# Patient Record
Sex: FEMALE | Race: BLACK OR AFRICAN AMERICAN | NOT HISPANIC OR LATINO | Employment: PART TIME
[De-identification: names, ages, dates, MRNs, and addresses within clinical notes are randomized per-mention and may not be internally consistent; named-entity substitution may affect disease eponyms.]

---

## 2017-10-15 ENCOUNTER — HEALTH MAINTENANCE LETTER (OUTPATIENT)
Age: 37
End: 2017-10-15

## 2020-10-26 ENCOUNTER — AMBULATORY - HEALTHEAST (OUTPATIENT)
Dept: OTHER | Facility: CLINIC | Age: 40
End: 2020-10-26

## 2021-02-28 ENCOUNTER — COMMUNICATION - HEALTHEAST (OUTPATIENT)
Dept: SCHEDULING | Facility: CLINIC | Age: 41
End: 2021-02-28

## 2021-03-02 ENCOUNTER — COMMUNICATION - HEALTHEAST (OUTPATIENT)
Dept: SCHEDULING | Facility: CLINIC | Age: 41
End: 2021-03-02

## 2021-05-14 ENCOUNTER — HOSPITAL ENCOUNTER (EMERGENCY)
Dept: EMERGENCY MEDICINE | Facility: CLINIC | Age: 41
Discharge: HOME | End: 2021-05-14
Attending: EMERGENCY MEDICINE
Payer: COMMERCIAL

## 2021-05-14 DIAGNOSIS — J45.901 EXACERBATION OF ASTHMA, UNSPECIFIED ASTHMA SEVERITY, UNSPECIFIED WHETHER PERSISTENT: ICD-10-CM

## 2021-05-14 DIAGNOSIS — R20.2 PARESTHESIAS: ICD-10-CM

## 2021-05-14 LAB
ALBUMIN SERPL-MCNC: 3.8 G/DL (ref 3.5–5)
ALP SERPL-CCNC: 67 U/L (ref 45–120)
ALT SERPL W P-5'-P-CCNC: 16 U/L (ref 0–45)
ANION GAP SERPL CALCULATED.3IONS-SCNC: 7 MMOL/L (ref 5–18)
AST SERPL W P-5'-P-CCNC: 17 U/L (ref 0–40)
ATRIAL RATE - MUSE: 97 BPM
BASOPHILS # BLD AUTO: 0.1 THOU/UL (ref 0–0.2)
BASOPHILS NFR BLD AUTO: 1 % (ref 0–2)
BILIRUB SERPL-MCNC: 0.4 MG/DL (ref 0–1)
BUN SERPL-MCNC: 9 MG/DL (ref 8–22)
CALCIUM SERPL-MCNC: 9.1 MG/DL (ref 8.5–10.5)
CHLORIDE BLD-SCNC: 107 MMOL/L (ref 98–107)
CO2 SERPL-SCNC: 27 MMOL/L (ref 22–31)
CREAT SERPL-MCNC: 0.7 MG/DL (ref 0.6–1.1)
DIASTOLIC BLOOD PRESSURE - MUSE: 95 MMHG
EOSINOPHIL # BLD AUTO: 0.1 THOU/UL (ref 0–0.4)
EOSINOPHIL NFR BLD AUTO: 1 % (ref 0–6)
ERYTHROCYTE [DISTWIDTH] IN BLOOD BY AUTOMATED COUNT: 13.4 % (ref 11–14.5)
GFR SERPL CREATININE-BSD FRML MDRD: >60 ML/MIN/1.73M2
GLUCOSE BLD-MCNC: 74 MG/DL (ref 70–125)
HCT VFR BLD AUTO: 39.1 % (ref 35–47)
HGB BLD-MCNC: 12.5 G/DL (ref 12–16)
IMM GRANULOCYTES # BLD: 0 THOU/UL
IMM GRANULOCYTES NFR BLD: 0 %
INTERPRETATION ECG - MUSE: NORMAL
LIPASE SERPL-CCNC: 31 U/L (ref 0–52)
LYMPHOCYTES # BLD AUTO: 3.9 THOU/UL (ref 0.8–4.4)
LYMPHOCYTES NFR BLD AUTO: 43 % (ref 20–40)
MAGNESIUM SERPL-MCNC: 2 MG/DL (ref 1.8–2.6)
MCH RBC QN AUTO: 28.3 PG (ref 27–34)
MCHC RBC AUTO-ENTMCNC: 32 G/DL (ref 32–36)
MCV RBC AUTO: 89 FL (ref 80–100)
MONOCYTES # BLD AUTO: 0.7 THOU/UL (ref 0–0.9)
MONOCYTES NFR BLD AUTO: 8 % (ref 2–10)
NEUTROPHILS # BLD AUTO: 4.3 THOU/UL (ref 2–7.7)
NEUTROPHILS NFR BLD AUTO: 48 % (ref 50–70)
P AXIS - MUSE: 43 DEGREES
PLATELET # BLD AUTO: 333 THOU/UL (ref 140–440)
PMV BLD AUTO: 10.6 FL (ref 8.5–12.5)
POTASSIUM BLD-SCNC: 4 MMOL/L (ref 3.5–5)
PR INTERVAL - MUSE: 128 MS
PROT SERPL-MCNC: 8.3 G/DL (ref 6–8)
QRS DURATION - MUSE: 84 MS
QT - MUSE: 348 MS
QTC - MUSE: 441 MS
R AXIS - MUSE: -7 DEGREES
RBC # BLD AUTO: 4.42 MILL/UL (ref 3.8–5.4)
SODIUM SERPL-SCNC: 141 MMOL/L (ref 136–145)
SYSTOLIC BLOOD PRESSURE - MUSE: 135 MMHG
T AXIS - MUSE: 21 DEGREES
TROPONIN I SERPL-MCNC: <0.01 NG/ML (ref 0–0.29)
VENTRICULAR RATE- MUSE: 97 BPM
WBC: 9 THOU/UL (ref 4–11)

## 2021-05-15 ENCOUNTER — COMMUNICATION - HEALTHEAST (OUTPATIENT)
Dept: SCHEDULING | Facility: CLINIC | Age: 41
End: 2021-05-15

## 2021-05-21 ENCOUNTER — HOSPITAL ENCOUNTER (EMERGENCY)
Dept: EMERGENCY MEDICINE | Facility: CLINIC | Age: 41
Discharge: HOME | End: 2021-05-22
Attending: EMERGENCY MEDICINE
Payer: COMMERCIAL

## 2021-05-21 DIAGNOSIS — I10 HYPERTENSION, UNSPECIFIED TYPE: ICD-10-CM

## 2021-05-21 DIAGNOSIS — R51.9 ACUTE NONINTRACTABLE HEADACHE, UNSPECIFIED HEADACHE TYPE: ICD-10-CM

## 2021-05-21 LAB
ANION GAP SERPL CALCULATED.3IONS-SCNC: 12 MMOL/L (ref 5–18)
APTT PPP: 29 SECONDS (ref 24–37)
BASOPHILS # BLD AUTO: 0 THOU/UL (ref 0–0.2)
BASOPHILS NFR BLD AUTO: 0 % (ref 0–2)
BUN SERPL-MCNC: 10 MG/DL (ref 8–22)
CALCIUM SERPL-MCNC: 9.1 MG/DL (ref 8.5–10.5)
CHLORIDE BLD-SCNC: 104 MMOL/L (ref 98–107)
CO2 SERPL-SCNC: 24 MMOL/L (ref 22–31)
CREAT SERPL-MCNC: 0.74 MG/DL (ref 0.6–1.1)
EOSINOPHIL # BLD AUTO: 0.1 THOU/UL (ref 0–0.4)
EOSINOPHIL NFR BLD AUTO: 1 % (ref 0–6)
ERYTHROCYTE [DISTWIDTH] IN BLOOD BY AUTOMATED COUNT: 13.3 % (ref 11–14.5)
GFR SERPL CREATININE-BSD FRML MDRD: >60 ML/MIN/1.73M2
GLUCOSE BLD-MCNC: 99 MG/DL (ref 70–125)
HCT VFR BLD AUTO: 38 % (ref 35–47)
HGB BLD-MCNC: 12.1 G/DL (ref 12–16)
IMM GRANULOCYTES # BLD: 0 THOU/UL
IMM GRANULOCYTES NFR BLD: 0 %
INR PPP: 1.01 (ref 0.9–1.1)
LYMPHOCYTES # BLD AUTO: 4.5 THOU/UL (ref 0.8–4.4)
LYMPHOCYTES NFR BLD AUTO: 43 % (ref 20–40)
MCH RBC QN AUTO: 28 PG (ref 27–34)
MCHC RBC AUTO-ENTMCNC: 31.8 G/DL (ref 32–36)
MCV RBC AUTO: 88 FL (ref 80–100)
MONOCYTES # BLD AUTO: 0.6 THOU/UL (ref 0–0.9)
MONOCYTES NFR BLD AUTO: 6 % (ref 2–10)
NEUTROPHILS # BLD AUTO: 5.2 THOU/UL (ref 2–7.7)
NEUTROPHILS NFR BLD AUTO: 50 % (ref 50–70)
PLATELET # BLD AUTO: 359 THOU/UL (ref 140–440)
PMV BLD AUTO: 10.2 FL (ref 8.5–12.5)
POC PREG URINE (HCG) HE - HISTORICAL: NEGATIVE
POCT KIT EXPIRATION DATE HE - HISTORICAL: NORMAL
POCT KIT LOT NUMBER HE - HISTORICAL: NORMAL
POCT NEGATIVE CONTROL HE - HISTORICAL: NORMAL
POCT POSITIVE CONTROL HE - HISTORICAL: NORMAL
POTASSIUM BLD-SCNC: 3.9 MMOL/L (ref 3.5–5)
RBC # BLD AUTO: 4.32 MILL/UL (ref 3.8–5.4)
SODIUM SERPL-SCNC: 140 MMOL/L (ref 136–145)
WBC: 10.5 THOU/UL (ref 4–11)

## 2021-05-21 ASSESSMENT — MIFFLIN-ST. JEOR: SCORE: 1679.2

## 2021-05-22 ENCOUNTER — COMMUNICATION - HEALTHEAST (OUTPATIENT)
Dept: SCHEDULING | Facility: CLINIC | Age: 41
End: 2021-05-22

## 2021-05-22 ENCOUNTER — HOSPITAL ENCOUNTER (EMERGENCY)
Dept: EMERGENCY MEDICINE | Facility: CLINIC | Age: 41
Discharge: HOME | End: 2021-05-23
Attending: EMERGENCY MEDICINE
Payer: COMMERCIAL

## 2021-05-22 DIAGNOSIS — G44.209 ACUTE NON INTRACTABLE TENSION-TYPE HEADACHE: ICD-10-CM

## 2021-05-22 DIAGNOSIS — R20.2 PARESTHESIA: ICD-10-CM

## 2021-05-22 DIAGNOSIS — I10 ESSENTIAL HYPERTENSION: ICD-10-CM

## 2021-05-22 LAB
ALBUMIN SERPL-MCNC: 3.8 G/DL (ref 3.5–5)
ALP SERPL-CCNC: 83 U/L (ref 45–120)
ALT SERPL W P-5'-P-CCNC: 43 U/L (ref 0–45)
ANION GAP SERPL CALCULATED.3IONS-SCNC: 7 MMOL/L (ref 5–18)
AST SERPL W P-5'-P-CCNC: 28 U/L (ref 0–40)
ATRIAL RATE - MUSE: 101 BPM
BILIRUB SERPL-MCNC: 0.4 MG/DL (ref 0–1)
BUN SERPL-MCNC: 9 MG/DL (ref 8–22)
CALCIUM SERPL-MCNC: 9.1 MG/DL (ref 8.5–10.5)
CHLORIDE BLD-SCNC: 103 MMOL/L (ref 98–107)
CO2 SERPL-SCNC: 28 MMOL/L (ref 22–31)
CREAT SERPL-MCNC: 0.74 MG/DL (ref 0.6–1.1)
DIASTOLIC BLOOD PRESSURE - MUSE: NORMAL
GFR SERPL CREATININE-BSD FRML MDRD: >60 ML/MIN/1.73M2
GLUCOSE BLD-MCNC: 118 MG/DL (ref 70–125)
INTERPRETATION ECG - MUSE: NORMAL
MAGNESIUM SERPL-MCNC: 2 MG/DL (ref 1.8–2.6)
P AXIS - MUSE: 33 DEGREES
POTASSIUM BLD-SCNC: 3.8 MMOL/L (ref 3.5–5)
PR INTERVAL - MUSE: 148 MS
PROT SERPL-MCNC: 8.3 G/DL (ref 6–8)
QRS DURATION - MUSE: 76 MS
QT - MUSE: 352 MS
QTC - MUSE: 456 MS
R AXIS - MUSE: -12 DEGREES
SODIUM SERPL-SCNC: 138 MMOL/L (ref 136–145)
SYSTOLIC BLOOD PRESSURE - MUSE: NORMAL
T AXIS - MUSE: 21 DEGREES
VENTRICULAR RATE- MUSE: 101 BPM

## 2021-05-22 ASSESSMENT — MIFFLIN-ST. JEOR: SCORE: 1679.2

## 2021-05-23 LAB
ERYTHROCYTE [DISTWIDTH] IN BLOOD BY AUTOMATED COUNT: 13.2 % (ref 11–14.5)
HCT VFR BLD AUTO: 36.1 % (ref 35–47)
HGB BLD-MCNC: 11.7 G/DL (ref 12–16)
MCH RBC QN AUTO: 28.8 PG (ref 27–34)
MCHC RBC AUTO-ENTMCNC: 32.4 G/DL (ref 32–36)
MCV RBC AUTO: 89 FL (ref 80–100)
PLATELET # BLD AUTO: 328 THOU/UL (ref 140–440)
PMV BLD AUTO: 10.1 FL (ref 8.5–12.5)
RBC # BLD AUTO: 4.06 MILL/UL (ref 3.8–5.4)
WBC: 9.1 THOU/UL (ref 4–11)

## 2021-05-24 LAB
ATRIAL RATE - MUSE: 104 BPM
DIASTOLIC BLOOD PRESSURE - MUSE: 92 MMHG
INTERPRETATION ECG - MUSE: NORMAL
P AXIS - MUSE: 36 DEGREES
PR INTERVAL - MUSE: 146 MS
QRS DURATION - MUSE: 78 MS
QT - MUSE: 332 MS
QTC - MUSE: 436 MS
R AXIS - MUSE: -7 DEGREES
SYSTOLIC BLOOD PRESSURE - MUSE: 158 MMHG
T AXIS - MUSE: 16 DEGREES
VENTRICULAR RATE- MUSE: 104 BPM

## 2021-05-27 VITALS — WEIGHT: 234 LBS

## 2021-06-02 ENCOUNTER — RECORDS - HEALTHEAST (OUTPATIENT)
Dept: ADMINISTRATIVE | Facility: CLINIC | Age: 41
End: 2021-06-02

## 2021-06-15 NOTE — TELEPHONE ENCOUNTER
Call received from spouse, Juve.  Verbal consent from patient to speak to spouse.  Nancy is also present via speaker phone.    Recently hospitalized and has questions:  - She was not given a cardiac monitor at discharge  - She needs a note to return to work  - She needs a referral in order to schedule at the breast clinic    Currently, Jenny has a PCP appointment scheduled for tomorrow.    Advised to address these needs at that appointment.    Ena Heard RN  [facility] Nurse Advisor      Reason for Disposition    [1] Caller requesting NON-URGENT health information AND [2] PCP's office is the best resource    Protocols used: INFORMATION ONLY CALL-A-

## 2021-06-17 NOTE — ED PROVIDER NOTES
EMERGENCY DEPARTMENT ENCOUNTER      NAME: Nancy Delgado  AGE: 40 y.o. female  YOB: 1980  MRN: [id number]  EVALUATION DATE & TIME: 5/21/2021  9:27 PM    PCP: Summer Joel MD    ED PROVIDER: Amilcar Leung M.D.      Chief Complaint   Patient presents with     Hypertension         FINAL IMPRESSION:  1. Hypertension, unspecified type    2. Acute nonintractable headache, unspecified headache type          ED COURSE & MEDICAL DECISION MAKING:    Pertinent Labs & Imaging studies reviewed. (See chart for details)  PPE:  N95, goggles and gloves   10:05 PM I met with the patient for the initial interview and physical examination. Discussed plan for treatment and workup in the ED.   12:32 AM We discussed the plan for discharge and the patient is agreeable. Reviewed supportive cares, symptomatic treatment, outpatient follow up, and reasons to return to the Emergency Department. Patient to be discharged by ED RN.      40 y.o. female presents to the Emergency Department for evaluation of headache and hypertension.  Headache seems more consistent with migraine though could also be hypertensive headache.  Did consider intracranial hemorrhage.  To get a CT head CT and CTA.  No obvious signs of hemorrhage.  There is a questionable pseudoaneurysm of the ICA though seems likely artifact.  This would not be consistent with her symptoms.  I do not think she needs a lumbar puncture.  Labs otherwise unremarkable.  Creatinine is normal.  Patient feeling better here.  Blood pressure improved.  Given IV Dilaudid and IV Reglan with improvement of her symptoms.  We will have her follow-up with her primary for recheck.  She will return for any worsening symptoms.k    At the conclusion of the encounter I discussed the results of all of the tests and the disposition. The questions were answered. The patient or family acknowledged understanding and was agreeable with the care plan.       MEDICATIONS GIVEN IN THE  EMERGENCY:  Medications   sodium chloride flush 10 mL (NS) (has no administration in time range)   sodium chloride 0.9% 1,000 mL (0 mL Intravenous Stopped 5/22/21 0020)   metoclopramide injection 10 mg (REGLAN) (10 mg Intravenous Given 5/21/21 2226)   iopamidol solution 100 mL (ISOVUE-370) (100 mL Intravenous Given 5/21/21 2333)   HYDROmorphone injection 0.5 mg (DILAUDID) (0.5 mg Intravenous Given 5/21/21 2356)       NEW PRESCRIPTIONS STARTED AT TODAY'S ER VISIT  Current Discharge Medication List      CONTINUE these medications which have NOT CHANGED    Details   acetaminophen (TYLENOL) 500 MG tablet Take 1,000 mg by mouth every 6 (six) hours as needed for pain.      albuterol (PROVENTIL HFA;VENTOLIN HFA) 90 mcg/actuation inhaler Inhale 2 puffs every 6 (six) hours as needed for wheezing.      amLODIPine (NORVASC) 5 MG tablet Take 5 mg by mouth daily.      fluocinolone 0.01 % Oil Apply 1 application topically 2 (two) times a week. Apply to areas of hair loss twice daily and to entire scalp 1-2 x per week.      ibuprofen (ADVIL,MOTRIN) 200 MG tablet Take 400 mg by mouth every 6 (six) hours as needed for pain.      meclizine (ANTIVERT) 25 mg tablet Take 1 tablet (25 mg total) by mouth 3 (three) times a day as needed.  Qty: 30 tablet, Refills: 0    Associated Diagnoses: Dizziness                =================================================================    HPI    Patient information was obtained from: Patient    Use of : N/A      Nancy Delgado is a 40 y.o. female with a pertinent history of morbid obesity, HTN, vertigo, and generalized headaches who presents to this ED via walk in for evaluation of hypertension.    Per chart review, patient was seen in this ED on 05/14/21 (1 week ago) for evaluation of chest pain, palpitations, cough, and paresthesias. Normal EKG, chest x-ray, and labs. Nebulizer improved wheezing. She was discharged to start on prednisone.    Patient reports hypertension throughout  the afternoon today (systolic 140s on home monitor) with accompanying continuous, worsening, pounding frontal headache which radiates to the back of the head, not relieved with 2-3 ibuprofen at home. Patient notes a history of headaches but states this is more severe than her usual. She also notes dizziness, lightheadedness, photophobia of the left eye, nausea, and one episode of vomiting prior to arrival. She denies visual disturbance or any other pertinent complaints at this time.     REVIEW OF SYSTEMS   Review of Systems   Eyes: Positive for photophobia (left eye). Negative for visual disturbance.   Gastrointestinal: Positive for nausea and vomiting (one episode).   Neurological: Positive for dizziness, light-headedness and headaches.   All other systems reviewed and are negative.       PAST MEDICAL HISTORY:  Past Medical History:   Diagnosis Date     Asthma        PAST SURGICAL HISTORY:  Past Surgical History:   Procedure Laterality Date     Right-sided tympanomastoidectomy             CURRENT MEDICATIONS:    No current facility-administered medications on file prior to encounter.      Current Outpatient Medications on File Prior to Encounter   Medication Sig     acetaminophen (TYLENOL) 500 MG tablet Take 1,000 mg by mouth every 6 (six) hours as needed for pain.     albuterol (PROVENTIL HFA;VENTOLIN HFA) 90 mcg/actuation inhaler Inhale 2 puffs every 6 (six) hours as needed for wheezing.     amLODIPine (NORVASC) 5 MG tablet Take 5 mg by mouth daily.     fluocinolone 0.01 % Oil Apply 1 application topically 2 (two) times a week. Apply to areas of hair loss twice daily and to entire scalp 1-2 x per week.     ibuprofen (ADVIL,MOTRIN) 200 MG tablet Take 400 mg by mouth every 6 (six) hours as needed for pain.     meclizine (ANTIVERT) 25 mg tablet Take 1 tablet (25 mg total) by mouth 3 (three) times a day as needed.       ALLERGIES:  Allergies   Allergen Reactions     Acetazolamide Itching     Tramadol Itching      "Oxycodone      Vicodin [Hydrocodone-Acetaminophen]        FAMILY HISTORY:  History reviewed. No pertinent family history.    SOCIAL HISTORY:   Social History     Socioeconomic History     Marital status:      Spouse name: None     Number of children: None     Years of education: None     Highest education level: None   Occupational History     None   Social Needs     Financial resource strain: None     Food insecurity     Worry: None     Inability: None     Transportation needs     Medical: None     Non-medical: None   Tobacco Use     Smoking status: Never Smoker   Substance and Sexual Activity     Alcohol use: No     Drug use: No     Sexual activity: None   Lifestyle     Physical activity     Days per week: None     Minutes per session: None     Stress: None   Relationships     Social connections     Talks on phone: None     Gets together: None     Attends Worship service: None     Active member of club or organization: None     Attends meetings of clubs or organizations: None     Relationship status: None     Intimate partner violence     Fear of current or ex partner: None     Emotionally abused: None     Physically abused: None     Forced sexual activity: None   Other Topics Concern     None   Social History Narrative     None       VITALS:  Patient Vitals for the past 24 hrs:   BP Temp Temp src Pulse Resp SpO2 Height Weight   05/21/21 2246 95/76 -- -- 96 (!) 55 99 % -- --   05/21/21 2232 140/84 -- -- 94 25 100 % -- --   05/21/21 2145 128/87 -- -- (!) 101 19 100 % -- --   05/21/21 2138 (!) 138/94 -- -- (!) 103 17 97 % -- --   05/21/21 2125 (!) 188/110 97.6  F (36.4  C) Temporal (!) 112 20 100 % 5' 2\" (1.575 m) (!) 235 lb (106.6 kg)       PHYSICAL EXAM    Constitutional:  Well developed, well nourished, no acute distress   EYES: Conjunctivae clear  HENT:  Atraumatic, normocephalic Bilateral external ears normal, nose normal, oropharynx moist. Neck- supple   Respiratory:  No respiratory distress, normal " breath sounds, no rales, no wheezing, no cough, no stridor   Cardiovascular:  Normal rate, normal rhythm, no murmurs, capillary refill normal.  No chest wall tenderness  GI:  Soft, nondistended, nontender, no palpable masses, no rebound, no guarding   : No CVA tenderness.    Musculoskeletal:  No edema.  No cyanosis.  Range of motion major extremities intact. No tenderness to palpation or major deformities noted.    Integument: Warm, Dry, No erythema, No rash.   Neurologic:  Alert & oriented, 5 out of 5 strength in bilateral upper and lower extremities.  Sensation intact in all 4 extremes.  Cranial nerves intact.  No pronator drift , ambulatory  Psych: Affect normal, Mood normal.    NIH Stroke Scale      Interval: 2 hours post onset of symptoms  Time: 10:05 PM  Person Administering Scale: Amilcar Leung MD    Administer stroke scale items in the order listed. Record performance in each category after each subscale exam. Do not go back and change scores. Follow directions provided for each exam technique. Scores should reflect what the patient does, not what the clinician thinks the patient can do. The clinician should record answers while administering the exam and work quickly. Except where indicated, the patient should not be coached (i.e., repeated requests to patient to make a special effort).      1a  Level of consciousness: 0=alert; keenly responsive   1b. LOC questions:  0=Performs both tasks correctly   1c. LOC commands: 0=Performs both tasks correctly   2.  Best Gaze: 0=normal   3.  Visual: 0=No visual loss   4. Facial Palsy: 0=Normal symmetric movement   5a.  Motor left arm: 0=No drift, limb holds 90 (or 45) degrees for full 10 seconds   5b.  Motor right arm: 0=No drift, limb holds 90 (or 45) degrees for full 10 seconds   6a. motor left le=No drift, limb holds 90 (or 45) degrees for full 10 seconds   6b  Motor right le=No drift, limb holds 90 (or 45) degrees for full 10 seconds   7. Limb  Ataxia: 0=Absent   8.  Sensory: 0=Normal; no sensory loss   9. Best Language:  0=No aphasia, normal   10. Dysarthria: 0=Normal   11. Extinction and Inattention: 0=No abnormality   12. Distal motor function: 0=Normal    Total:   0          LAB:  All pertinent labs reviewed and interpreted.  Results for orders placed or performed during the hospital encounter of 05/21/21   Basic Metabolic Panel   Result Value Ref Range    Sodium 140 136 - 145 mmol/L    Potassium 3.9 3.5 - 5.0 mmol/L    Chloride 104 98 - 107 mmol/L    CO2 24 22 - 31 mmol/L    Anion Gap, Calculation 12 5 - 18 mmol/L    Glucose 99 70 - 125 mg/dL    Calcium 9.1 8.5 - 10.5 mg/dL    BUN 10 8 - 22 mg/dL    Creatinine 0.74 0.60 - 1.10 mg/dL    GFR MDRD Af Amer >60 >60 mL/min/1.73m2    GFR MDRD Non Af Amer >60 >60 mL/min/1.73m2   INR   Result Value Ref Range    INR 1.01 0.90 - 1.10   APTT(PTT)   Result Value Ref Range    PTT 29 24 - 37 seconds   HM1 (CBC with Diff)   Result Value Ref Range    WBC 10.5 4.0 - 11.0 thou/uL    RBC 4.32 3.80 - 5.40 mill/uL    Hemoglobin 12.1 12.0 - 16.0 g/dL    Hematocrit 38.0 35.0 - 47.0 %    MCV 88 80 - 100 fL    MCH 28.0 27.0 - 34.0 pg    MCHC 31.8 (L) 32.0 - 36.0 g/dL    RDW 13.3 11.0 - 14.5 %    Platelets 359 140 - 440 thou/uL    MPV 10.2 8.5 - 12.5 fL    Neutrophils % 50 50 - 70 %    Lymphocytes % 43 (H) 20 - 40 %    Monocytes % 6 2 - 10 %    Eosinophils % 1 0 - 6 %    Basophils % 0 0 - 2 %    Immature Granulocyte % 0 <=0 %    Neutrophils Absolute 5.2 2.0 - 7.7 thou/uL    Lymphocytes Absolute 4.5 (H) 0.8 - 4.4 thou/uL    Monocytes Absolute 0.6 0.0 - 0.9 thou/uL    Eosinophils Absolute 0.1 0.0 - 0.4 thou/uL    Basophils Absolute 0.0 0.0 - 0.2 thou/uL    Immature Granulocyte Absolute 0.0 <=0.0 thou/uL   POCT pregnancy, urine   Result Value Ref Range    POC Preg, Urine Negative Negative    POCT Kit Lot Number [id number]     POCT Kit Expiration Date 11-     Pos Control Valid Control Valid Control    Neg Control Valid  Control Valid Control       RADIOLOGY:  Reviewed all pertinent imaging. Please see official radiology report.  Cta Head And Neck    Result Date: 5/22/2021  EXAM: CTA HEAD AND NECK LOCATION: [facility] DATE/TIME: 5/21/2021 11:34 PM INDICATION: headache, hypertension COMPARISON: CT head 02/27/2021 CONTRAST: Iopamidol (Isovue-370) 100mL TECHNIQUE: Head and neck CT angiogram with IV contrast. Noncontrast head CT followed by axial helical CT images of the head and neck vessels obtained during the arterial phase of intravenous contrast administration. Axial 2D reconstructed images and multiplanar 3D MIP reconstructed images of the head and neck vessels were performed by the technologist. Dose reduction techniques were used. All stenosis measurements made according to NASCET criteria unless otherwise specified. FINDINGS: NONCONTRAST HEAD CT: INTRACRANIAL CONTENTS: No intracranial hemorrhage, extraaxial collection, or mass effect.  No CT evidence of acute infarct. Normal parenchymal attenuation. Normal ventricles and sulci. VISUALIZED ORBITS/SINUSES/MASTOIDS: No intraorbital abnormality. No paranasal sinus mucosal disease. Post right mastoidectomy. BONES/SOFT TISSUES: No acute abnormality. HEAD CTA: ANTERIOR CIRCULATION: No stenosis/occlusion, aneurysm, or high flow vascular malformation. Standard [place] anatomy. POSTERIOR CIRCULATION: No stenosis/occlusion, aneurysm, or high flow vascular malformation. Dominant left and smaller right vertebral artery contribute to a normal basilar artery. DURAL VENOUS SINUSES: Expected enhancement of the major dural venous sinuses. NECK CTA: RIGHT CAROTID: No measurable stenosis or dissection. LEFT CAROTID: No measurable stenosis or dissection. Very tiny outpouching at the proximal left ICA posterior laterally could represent tiny pseudoaneurysm versus artifact from volume averaging. VERTEBRAL ARTERIES: No focal stenosis or dissection. Dominant left and  smaller right vertebral arteries. AORTIC ARCH: Classic aortic arch anatomy with no significant stenosis at the origin of the great vessels. NONVASCULAR STRUCTURES: Unremarkable.     HEAD CT: 1.  Normal head CT. HEAD CTA: 1.  Normal CTA [place]. NECK CTA: 1.  No hemodynamically significant narrowing of a major intracranial vessel. 2.  Questionable tiny pseudoaneurysm at the proximal left ICA, as described.      EKG:    Performed at: 2137    Impression: Sinus tachycardia.  Unchanged from previous dated May 14, 2021.    Sinus tachycardia ventricular to 101.  NC interval 148.  QRS 76.  QTc 456  I have independently reviewed and interpreted the EKG(s) documented above.      I, Yovany Armijo, am serving as a scribe to document services personally performed by Dr. Amilcar Leung based on my observation and the provider's statements to me. I, Amilcar Leung MD attest that Yovany Armijo is acting in a scribe capacity, has observed my performance of the services and has documented them in accordance with my direction.    Amilcar Leung M.D.  Emergency Medicine  [facility] EMERGENCY ROOM  [address] [address]  Dept: [phone]  Loc: [phone]       Amilcar Leung MD  05/22/21 0102

## 2021-06-17 NOTE — ED TRIAGE NOTES
Pt presents via EMS with headache and hypertension. Pt reports episode of facial numbness at 1900 lasting 10 minutes but resolving. No deficits noted currently

## 2021-06-17 NOTE — ED TRIAGE NOTES
Pt with 2 hour history of headache in the frontal lobe wrapping back to the occipital. Also dizziness and leg weakness and tingling. And hypertension.

## 2021-06-17 NOTE — ED TRIAGE NOTES
Patient reports that she at therapy for vertigo this morning and her legs became numb, which has since resolved.  Patient reports that she feels like her heart is racing and has been taking Carvedilol, which is not helping she reports.  Patient also has a occipital headache.  Mimi Deleon RN.......5/14/2021 4:28 PM    Patient reports that she is tested weekly at work for Covid, she had a negative test on Monday, has had a cough r/t to her asthma, she reports.

## 2021-06-17 NOTE — ED PROVIDER NOTES
EMERGENCY DEPARTMENT ENCOUNTER      NAME: Nancy Delgado  AGE: 40 y.o. female  YOB: 1980  MRN: [id number]  EVALUATION DATE & TIME: 5/14/2021  4:22 PM    PCP: Summer Joel MD    ED PROVIDER: Ema Jacob PA-C      Chief Complaint   Patient presents with     Palpitations     Headache         FINAL IMPRESSION:  1. Exacerbation of asthma, unspecified asthma severity, unspecified whether persistent    2. Paresthesias          ED COURSE & MEDICAL DECISION MAKING:    Pertinent Labs & Imaging studies reviewed. (See chart for details)    40 y.o. female presents to the Emergency Department for evaluation of multiple complaints.     Physical exam is remarkable for a well appearing female who is in no acute distress. Heart and lung sounds remarkable for diffuse expiratory wheezing, harsh cough noted. Patient has left anterior chest wall tenderness to palpation without skin abnormalities. Abdomen is soft and non-tender. No focal neurological deficits noted; strength is 5/5 in the upper and lower extremities bilaterally. Vital signs are stable and she is afebrile.     CBC is unremarkable with no leukocytosis or anemia.  CMP is unremarkable with no significant electrolyte derangements, normal liver and kidney function. Lipase within normal limits. Troponin is negative, EKG without acute ischemic changes. Magnesium within normal limits. CXR unremarkable.     The patient was given IV decadron for her asthma as well as toradol for a headache. She was also given a DUOneb with improvement in her coughing/wheezing. I do not think any further emergent labs or imaging are indicated at this time. The patient has been having ongoing chest pain and palpitations for months and reports that there have been no changes with this, she is not currently having chest pain so I do not think further cardiac evaluation is indicated. She has had multiple CTA PE runs since these symptoms started with no evidence of  PE. She had a cardiac stress test and ECHO last month which were unremarkable. Regarding her paresthesias, the symptoms resolved without intervention and she has not had recurrence of symptoms since being in the ED. There are no focal neurological deficits on exam and her symptoms were bilateral so my concern for stroke is low. I do suspect she is having an asthma exacerbation, sent prescription for prednisone to her pharmacy. She has an albuterol inhaler to use. Recommend follow up with her PCP in the next few days for recheck, advise return to the ER if she develops any new or worsening symptoms like severe pain,fever, chest pain or difficulty breathing worse than usual, passing out, new neurological deficits, or any other concerning symptoms. The patient is amenable to this treatment plan and verbalized her understanding. Care was discussed with  who is in agreement with the treatment plan.     ED Course   4:50 PM I met with the patient, obtained history, performed an initial exam, and discussed options and plan for diagnostics and treatment here in the ED. PPE worn including surgical mask and eye protection.   5:03 PM I staffed the case with Dr. Subramanian.   6:50 PM I rechecked and updated the patient. We discussed the plan for discharge and the patient is agreeable. Reviewed supportive cares, symptomatic treatment, outpatient follow up, and reasons to return to the Emergency Department. Patient to be discharged by ED RN.      At the conclusion of the encounter I discussed the results of all of the tests and the disposition. The questions were answered. The patient or family acknowledged understanding and was agreeable with the care plan.     Voice recognition software was used in the creation of this note. Any grammatical or nonsensical errors are due to inherent errors with the software and are not the intention of the writer.     MEDICATIONS GIVEN IN THE EMERGENCY:  Medications   sodium chloride flush 10 mL  (NS) (has no administration in time range)   ipratropium-albuteroL 0.5-2.5 mg/3 mL nebulizer solution 3 mL (DUO-NEB) (3 mL Nebulization Given 5/14/21 1755)   methylPREDNISolone sod suc(PF) injection 125 mg (Solu-MEDROL) (125 mg Intravenous Given 5/14/21 1833)   ketorolac injection 15 mg (TORADOL) (15 mg Intravenous Given 5/14/21 1832)       NEW PRESCRIPTIONS STARTED AT TODAY'S ER VISIT  Current Discharge Medication List      START taking these medications    Details   predniSONE (DELTASONE) 50 MG tablet Take 50 mg by mouth daily for 5 days.  Qty: 5 tablet, Refills: 0    Comments: Start tomorrow 05/15/21  Associated Diagnoses: Exacerbation of asthma, unspecified asthma severity, unspecified whether persistent         CONTINUE these medications which have NOT CHANGED    Details   acetaminophen (TYLENOL) 500 MG tablet Take 1,000 mg by mouth every 6 (six) hours as needed for pain.      albuterol (PROVENTIL HFA;VENTOLIN HFA) 90 mcg/actuation inhaler Inhale 2 puffs every 6 (six) hours as needed for wheezing.      amLODIPine (NORVASC) 5 MG tablet Take 5 mg by mouth daily.      fluocinolone 0.01 % Oil Apply 1 application topically 2 (two) times a week. Apply to areas of hair loss twice daily and to entire scalp 1-2 x per week.      ibuprofen (ADVIL,MOTRIN) 200 MG tablet Take 400 mg by mouth every 6 (six) hours as needed for pain.      meclizine (ANTIVERT) 25 mg tablet Take 1 tablet (25 mg total) by mouth 3 (three) times a day as needed.  Qty: 30 tablet, Refills: 0    Associated Diagnoses: Dizziness                =================================================================    HPI    Patient information was obtained from: Patient    Use of Intrepreter: N/A      Nancy Delgado is a 40 y.o. female with a PMHx of HTN, pseudotumor cerebri, migraines, and asthma, who presents with numbness.    Patient was at physical therapy this morning when she reports sudden onset of bilateral leg numbness, palpitations, as well as  sharp pains in her mid chest and the back of her head. She has been dealing with the intermittent palpitations and sharp chest and head pains for some time (and has been evaluated in the ED for them in the last few months), but she has never experienced the leg numbness before. The numbness only lasted for about 5 minutes and has now resolved. No focal weakness.  Additionally, the patient has been experiencing a cough for 5 days which she associates with an asthma exacerbation. No previous admissions for asthma. Patient has been vaccinated against COVID. She also is tested routinely and tested negative earlier this week. No fever, chills, nausea, vomiting, leg swelling, or other significant new symptoms at this time.       REVIEW OF SYSTEMS   Constitutional:  No reported fever, chills   Respiratory: Positive for cough  Cardiovascular: Positive for intermittent chest pain, palpitations  GI:  No reported nausea, vomiting  Neurologic:  No focal weakness. Positive for bilateral leg numbness (resolved), intermittent headaches    All other systems reviewed and are negative unless noted in HPI.    PAST MEDICAL HISTORY:  Past Medical History:   Diagnosis Date     Asthma        PAST SURGICAL HISTORY:  Past Surgical History:   Procedure Laterality Date     Right-sided tympanomastoidectomy         CURRENT MEDICATIONS:    No current facility-administered medications on file prior to encounter.      Current Outpatient Medications on File Prior to Encounter   Medication Sig     acetaminophen (TYLENOL) 500 MG tablet Take 1,000 mg by mouth every 6 (six) hours as needed for pain.     albuterol (PROVENTIL HFA;VENTOLIN HFA) 90 mcg/actuation inhaler Inhale 2 puffs every 6 (six) hours as needed for wheezing.     amLODIPine (NORVASC) 5 MG tablet Take 5 mg by mouth daily.     fluocinolone 0.01 % Oil Apply 1 application topically 2 (two) times a week. Apply to areas of hair loss twice daily and to entire scalp 1-2 x per week.     ibuprofen  (ADVIL,MOTRIN) 200 MG tablet Take 400 mg by mouth every 6 (six) hours as needed for pain.     meclizine (ANTIVERT) 25 mg tablet Take 1 tablet (25 mg total) by mouth 3 (three) times a day as needed.       ALLERGIES:  Allergies   Allergen Reactions     Acetazolamide Itching     Tramadol Itching     Oxycodone      Vicodin [Hydrocodone-Acetaminophen]        FAMILY HISTORY:  History reviewed. No pertinent family history.    SOCIAL HISTORY:   Social History     Socioeconomic History     Marital status:      Spouse name: None     Number of children: None     Years of education: None     Highest education level: None   Occupational History     None   Social Needs     Financial resource strain: None     Food insecurity     Worry: None     Inability: None     Transportation needs     Medical: None     Non-medical: None   Tobacco Use     Smoking status: Never Smoker   Substance and Sexual Activity     Alcohol use: No     Drug use: No     Sexual activity: None   Lifestyle     Physical activity     Days per week: None     Minutes per session: None     Stress: None   Relationships     Social connections     Talks on phone: None     Gets together: None     Attends Protestant service: None     Active member of club or organization: None     Attends meetings of clubs or organizations: None     Relationship status: None     Intimate partner violence     Fear of current or ex partner: None     Emotionally abused: None     Physically abused: None     Forced sexual activity: None   Other Topics Concern     None   Social History Narrative     None       VITALS:  Patient Vitals for the past 24 hrs:   BP Temp Pulse Resp SpO2 Weight   05/14/21 1836 -- -- 77 22 100 % --   05/14/21 1730 123/69 -- 92 -- 100 % --   05/14/21 1628 (!) 143/103 98.2  F (36.8  C) (!) 106 18 100 % (!) 234 lb (106.1 kg)       PHYSICAL EXAM    VITAL SIGNS: /69   Pulse 77   Temp 98.2  F (36.8  C)   Resp 22   Wt (!) 234 lb (106.1 kg)   SpO2 100%   BMI  42.80 kg/m    General Appearance: Alert, cooperative, normal speech and facial symmetry, appears stated age, the patient does not appear in distress  Head:  Normocephalic, without obvious abnormality, atraumatic  Eyes: Conjunctiva/corneas clear, EOM's intact, no nystagmus, PERRL  ENT:  Lips, mucosa, and tongue normal; teeth and gums normal, no pharyngeal inflammation, no dysphonia or difficulty swallowing, membranes are moist without pallor  Neck:  No C spine tenderness or stepoffs  Cardio:  Regular rate and rhythm, S1 and S2 normal, no murmur, rub    or gallop, 2+ pulses symmetric in all extremities  Pulm:  Clear to auscultation bilaterally, respirations unlabored with no accessory muscle use  Abdomen:  Abdomen is soft, non-distended with no tenderness to palpation, rebound tenderness, or guarding.   Extremities:  Extremities normal, there is no tenderness to palpation , atraumatic, no cyanosis or edema, full function and range of motion, pulses equal in all extremities, normal cap refill, no joint swelling; strength is 5/5 in the upper and lower extremities bilaterally  Neuro: Patient is awake, alert, and responsive to voice. No gross motor weaknesses or sensory loss; moves all extremities.     LAB:  All pertinent labs reviewed and interpreted.  Results for orders placed or performed during the hospital encounter of 05/14/21   Comprehensive Metabolic Panel   Result Value Ref Range    Sodium 141 136 - 145 mmol/L    Potassium 4.0 3.5 - 5.0 mmol/L    Chloride 107 98 - 107 mmol/L    CO2 27 22 - 31 mmol/L    Anion Gap, Calculation 7 5 - 18 mmol/L    Glucose 74 70 - 125 mg/dL    BUN 9 8 - 22 mg/dL    Creatinine 0.70 0.60 - 1.10 mg/dL    GFR MDRD Af Amer >60 >60 mL/min/1.73m2    GFR MDRD Non Af Amer >60 >60 mL/min/1.73m2    Bilirubin, Total 0.4 0.0 - 1.0 mg/dL    Calcium 9.1 8.5 - 10.5 mg/dL    Protein, Total 8.3 (H) 6.0 - 8.0 g/dL    Albumin 3.8 3.5 - 5.0 g/dL    Alkaline Phosphatase 67 45 - 120 U/L    AST 17 0 - 40 U/L     ALT 16 0 - 45 U/L   Lipase   Result Value Ref Range    Lipase 31 0 - 52 U/L   Troponin I   Result Value Ref Range    Troponin I <0.01 0.00 - 0.29 ng/mL   Magnesium   Result Value Ref Range    Magnesium 2.0 1.8 - 2.6 mg/dL   HM1 (CBC with Diff)   Result Value Ref Range    WBC 9.0 4.0 - 11.0 thou/uL    RBC 4.42 3.80 - 5.40 mill/uL    Hemoglobin 12.5 12.0 - 16.0 g/dL    Hematocrit 39.1 35.0 - 47.0 %    MCV 89 80 - 100 fL    MCH 28.3 27.0 - 34.0 pg    MCHC 32.0 32.0 - 36.0 g/dL    RDW 13.4 11.0 - 14.5 %    Platelets 333 140 - 440 thou/uL    MPV 10.6 8.5 - 12.5 fL    Neutrophils % 48 (L) 50 - 70 %    Lymphocytes % 43 (H) 20 - 40 %    Monocytes % 8 2 - 10 %    Eosinophils % 1 0 - 6 %    Basophils % 1 0 - 2 %    Immature Granulocyte % 0 <=0 %    Neutrophils Absolute 4.3 2.0 - 7.7 thou/uL    Lymphocytes Absolute 3.9 0.8 - 4.4 thou/uL    Monocytes Absolute 0.7 0.0 - 0.9 thou/uL    Eosinophils Absolute 0.1 0.0 - 0.4 thou/uL    Basophils Absolute 0.1 0.0 - 0.2 thou/uL    Immature Granulocyte Absolute 0.0 <=0.0 thou/uL       RADIOLOGY:  Reviewed all pertinent imaging. Please see official radiology report.  Xr Chest 1 View Portable    Result Date: 5/14/2021  EXAM: XR CHEST 1 VIEW PORTABLE LOCATION: [facility] DATE/TIME: 5/14/2021 5:56 PM INDICATION: cough COMPARISON: 9/19/2016     Negative chest.      EKG:    Performed at: 16:33    I have independently reviewed and interpreted the EKG, along with the final read. EKG also reviewed by Dr. Subramanian.    Ventricular rate 97 bpm  IL interval 128 ms  QRS duration 84 ms  QT/QTc 348/441 ms  P-R-T axes 43 -7 21    Impression: NSR; no changes compared to previous      IDorothea am serving as a scribe to document services personally performed by Ema Jacob PA-C based on my observation and the provider's statements to me. I, Ema Jacob PA-C attest that Dorothea Rizvi is acting in a scribe capacity, has observed my performance of the services  and has documented them in accordance with my direction.     Ema Jacob PA-C  Emergency Medicine  [facility] EMERGENCY ROOM  [address] [address]  Dept: [phone]  Loc: [phone]     Ema Jacob PA-C  05/14/21 5830

## 2021-06-17 NOTE — ED PROVIDER NOTES
EMERGENCY DEPARTMENT MIDLEVEL SUPERVISORY NOTE    Date/Time: 5/14/2021 5:04 PM    I am seeing this patient along with Ema Pichardo PA-C.  I have seen and evaluated the patient independently at bedside and agree with the TREMAYNE's history, assessment and plan. I was wearing PPE throughout this encounter including surgical mask, goggles and gloves.      BRIEF HPI    Nancy Delgado is a 40 y.o. female with a history of hypertension, pseudotumor cerebri, migraines, asthma, who presents to the ED via walk in for evaluation of numbness. Patient with sudden onset of bilateral lower extremity numbness, palpitations and chest pain at PT this morning. Numbness persisted for about 5 minutes. No focal weakness. She also noted 5 days of cough, which she attributes to asthma. She is fully vaccinated against COVID-19. No other physical complaints.     PHYSICAL EXAM  Vitals: /89   Pulse 89   Temp 98.2  F (36.8  C)   Resp 22   Wt (!) 234 lb (106.1 kg)   SpO2 100%   BMI 42.80 kg/m    General: Frequent dry cough.  No acute distress.  HENT: Atraumatic. MMM.   Neck: Full AROM.  Cardiovascular: Tachycardic, regular.  Respiratory/Chest: Frequent dry cough.  No acute distress.  Abdomen: Non-distended.  Musculoskeletal: Normal appearing extremities without obvious deformities or signs of trauma. No edema or erythema.  Skin: Normal color. No visible rash or diaphoresis.  Neurologic: Alert. Face symmetric, moves all extremities spontaneously. Speech clear.  Psychiatric: Affect appropriate, cooperative.    EKG:    Performed at: 1633  Impression: Normal sinus rhythm.  No acute ischemic changes.  Normal intervals.  No evidence of WPW, Brugada, HOCM.  No significant change from previous.  Rate: 97  Rhythm: Sinus  QRS Interval: 84  QTc Interval: 441  Comparison: 3/31/2021  I have independently reviewed and interpreted the EKG(s) documented above.     ED COURSE & MEDICAL DECISION MAKING    Pertinent Labs and Imaging reviewed (see chart  for details)    Nancy Delgado is a 40 y.o. year old who presents for evaluation of paresthesias, chest pain, palpitations and cough.  Patient is currently following with cardiology and has had an extensive evaluation including labs, stress test, echo, CT PE studies since the symptoms began.  Although she was complaining of bilateral leg numbness prior to arrival, this resolved prior to my initial assessment.  Patient does have a history of asthma and has an albuterol inhaler that she uses at home.  Vitals on arrival notable for tachycardia. History and physical exam, as above. Please see midlevel note for additional details.     EKG reassuring.  Chest x-ray negative.  Labs unremarkable.  Patient was given a nebulizer with improvement in wheezing.  Very possible that at least her cough is related to an asthma exacerbations we will plan to start on prednisone.  Patient has a cardiologist with whom she follows and troponin is negative.  I see no indication for further work-up of chest pain/palpitations at this time and patient felt reassured, comfortable with following up with her outpatient providers for recheck.  PA discussed indications for return to the emergency department with the patient and she verbalized understanding.  All questions answered.  Discharged in stable condition.        FINAL IMPRESSION    Final diagnoses:   Exacerbation of asthma, unspecified asthma severity, unspecified whether persistent   Paresthesias       I, Angelica Wood, am serving as a scribe to document services personally performed by Dr. Subramanian based on my observation and the provider's statements to me. I,  Chantel Subramanian MD attest that Angelica Wood is acting in a scribe capacity, has observed my performance of the services and has documented them in accordance with my direction.     Chantel Subramanian MD  Emergency Medicine  [place]    5/14/2021 5:04 PM      Chantel Subramanian MD  05/14/21 2034

## 2021-06-17 NOTE — TELEPHONE ENCOUNTER
Pt is calling.    Complaining of a HA and tingling in her legs.  BP is 166/118. Was seen in the ED last night.   Denies visual changes. Denies chest pain.  She does have some shortness of breath, but stated that this is from her asthma which was also evaluated at the ED last night.   Care advice reviewed. I advised her to go back to the ED for re-evaluation. Pt is able to contact outside PCP. I advised her that there should be an on call physician. If unable to get a hold of PCP, I advised her that she needs to be seen in the ED and she verbalized understanding.    Reason for Disposition    [1] Systolic BP  >= 160 OR Diastolic >= 100 AND [2] cardiac or neurologic symptoms (e.g., chest pain, difficulty breathing, unsteady gait, blurred vision)    Additional Information    Negative: Difficult to awaken or acting confused (e.g., disoriented, slurred speech)    Negative: Severe difficulty breathing (e.g., struggling for each breath, speaks in single words)    Negative: [1] Weakness of the face, arm or leg on one side of the body AND [2] new onset    Negative: [1] Numbness (i.e., loss of sensation) of the face, arm or leg on one side of the body AND [2] new onset    Negative: [1] Chest pain lasts > 5 minutes AND [2] history of heart disease  (i.e., heart attack, bypass surgery, angina, angioplasty, CHF)    Negative: [1] Chest pain AND [2] took nitrogylcerin AND [3] pain was not relieved    Negative: Sounds like a life-threatening emergency to the triager    Negative: Symptom is main concern  (e.g., headache, chest pain)    Negative: Low blood pressure is main concern    Protocols used: HIGH BLOOD PRESSURE-A-    Abida Leonardo RN   [facility] Nurse Advisor  05/15/21 at 10:19 PM

## 2021-06-17 NOTE — ED PROVIDER NOTES
EMERGENCY DEPARTMENT ENCOUNTER      NAME: Nancy Delgado  AGE: 40 y.o. female  YOB: 1980  MRN: [id number]  EVALUATION DATE & TIME: 2021  9:30 PM    PCP: Summer Joel MD    ED PROVIDER: SOPHIE Velasquez    Chief Complaint   Patient presents with     Hypertension     FINAL IMPRESSION:  1. Essential hypertension    2. Paresthesia    3. Acute non intractable tension-type headache      ED COURSE & MEDICAL DECISION MAKIN:38 PM I met with the patient to gather history and to perform my initial exam. I discussed the plan for care while in the Emergency Department. PPE (gloves, glasses, surgical mask) was worn during patient encounters.     Pertinent Labs & Imaging studies reviewed. (See chart for details)  40 y.o. female presents to the Emergency Department for evaluation of headache, nausea, elevated blood pressure and left-sided facial numbness.  Patient evaluated in this emergency department yesterday evening for headache and hypertension.  Had CT CTA and laboratory testing performed.  Medicated for her headache in the emergency department.  Symptoms improved with interventions in the emergency department.  No acute intracranial findings clearly identified on CT CTA imaging.  Discharged with plan for outpatient follow-up.  She is chronically maintained on antihypertensive medications amlodipine and carvedilol.  Reports after leaving the emergency department she developed nausea vomiting in route to home.  This persisted throughout the evening.  She had return of her headache.  Overall her symptoms were manageable throughout the day but then after awakening from a nap she noted left-sided facial numbness this caused her to contact the nurse care line and they recommended emergency department evaluation.  She reports that her symptoms actually are resolving at this point with near resolution of the numbness to the left side of her face.  Denies weakness or facial droop.  Otherwise  symptoms fairly similar to what she was experiencing yesterday.  Has a history of relatively common headaches although not to this level or persistence.  Clinical examination notable for elevated blood pressure 162/104 heart rate was 105 vital signs otherwise unremarkable.  Patient did not appear to be in acute distress.  Neurological examination was normal.  NIH stroke scale normal.  No other concerning exam findings.  ECG demonstrating sinus tachycardia otherwise unchanged compared to ECG yesterday.  In light of her neurological symptoms I did recommend MRI imaging for further assessment.  Medicating patient's headache.  Will monitor her blood pressure in the emergency department and reassess after return of her testing results.    1:16 AM  Unremarkable.  Neurologically intact on initial and subsequent examination.  MRI unremarkable.  Blood pressure did improve with management of her headache and additional medications in the emergency department.  Patient is very anxious with regard to her labile blood pressures over the last several weeks.  Frustrated at the difficulty in controlling it.  Had a long discussion with her regarding elevated blood pressures in the emergency department and appropriate management both acute and long-term.  Overall given her clinical appearance and her unremarkable clinical examination and work-up at this time I do think the patient is okay for discharge home will work to get her blood pressure under better control with the addition of clonidine.  She has a scheduled follow-up with her primary care doctor in 48 hours.  I told her that is very important to keep that appointment to continue to monitor this and work to get her symptoms and her blood pressures under better control.  We did provide a work note to help facilitate that.  Overall patient was comfortable with this plan of care.  Relative to the facial tingling she experienced earlier.  I do think there is a component of anxiety  related to this is correlated with her checking her blood pressure and finding it to be elevated.  No intracranial pathology or stroke or other identifiable source for that symptomatology she has no facial droop to suggest Bell's palsy or other pathology at this point.  Recommended close monitoring of the symptoms given they have resolved at this point as mentioned I do think she is okay for discharge if the symptoms recur she has instructions to return to the emergency department.  Reviewed return precautions prior to discharge.    Plan and all results were discussed  Time was taken to answer all questions. Patient and/or associated parties expressed understanding and were agreeable to the treatment plan.  Warning signs and ER return precautions provided. Discharged with discharge instructions outlining plan for further care and follow up.     MEDICATIONS GIVEN IN THE EMERGENCY:  Medications   sodium chloride flush 10 mL (NS) (has no administration in time range)   sodium chloride 0.9% 500 mL (500 mL Intravenous New Bag 5/22/21 2203)   cloNIDine HCL tablet 0.1 mg (CATAPRES) (0.1 mg Oral Given 5/22/21 2150)   ketorolac injection 30 mg (TORADOL) (30 mg Intravenous Given 5/22/21 2203)   ondansetron injection 4 mg (ZOFRAN) (4 mg Intravenous Given 5/22/21 2203)   metoclopramide injection 10 mg (REGLAN) (10 mg Intravenous Given 5/23/21 0021)   diphenhydrAMINE injection 25 mg (BENADRYL) (25 mg Intravenous Given 5/23/21 0020)       NEW PRESCRIPTIONS STARTED AT TODAY'S ER VISIT  Current Discharge Medication List      CONTINUE these medications which have NOT CHANGED    Details   acetaminophen (TYLENOL) 500 MG tablet Take 1,000 mg by mouth every 6 (six) hours as needed for pain.      albuterol (PROVENTIL HFA;VENTOLIN HFA) 90 mcg/actuation inhaler Inhale 2 puffs every 6 (six) hours as needed for wheezing.      amLODIPine (NORVASC) 5 MG tablet Take 5 mg by mouth daily.      fluocinolone 0.01 % Oil Apply 1 application topically  2 (two) times a week. Apply to areas of hair loss twice daily and to entire scalp 1-2 x per week.      ibuprofen (ADVIL,MOTRIN) 200 MG tablet Take 400 mg by mouth every 6 (six) hours as needed for pain.      meclizine (ANTIVERT) 25 mg tablet Take 1 tablet (25 mg total) by mouth 3 (three) times a day as needed.  Qty: 30 tablet, Refills: 0    Associated Diagnoses: Dizziness                =================================================================    HPI    Patient information was obtained from: Patient    Use of Intrepreter: N/A       Nancy Delgado is a 40 y.o. female who has a pertinent medical history of HTN and morbid obesity who presents for evaluation of facial numbness.    Per chart review, patient was seen at [facility] Emergency Department on 5/21 (~1 day ago) for evaluation of headache hypertension. CT head showed no obvious signs of hemorrhage. There was questionable pseudoaneurysm of the ICA though was likely artifact. Creatine was normal. Labs otherwise unremarkable. Patient discharged home in stable condition after Dilaudid and Reglan with improvement in symptoms.       Patient reports that last night immediately after leaving the ER she vomited for 1 hour. Then this morning when she woke up the patient states her headache returned, it was slightly better than yesterday, so she checked her blood pressure which was 185/130. Then she noticed around 1930 (~2.5 hours ago) patient developed left sided facial numbness where she feels that her teeth are moving and her lip are swollen. She states those symptoms have slightly improved. Her last blood pressure was 180/127. She called the nurse line and they recommended patient she present to the ED.     Patient notes she has received her COVID vaccine, which is when her hypertension started. Otherwise her father has a history of blood pressure. She currently taking Amilodipine for her HTN. She is also taking Meclazine for vertigo. Additionally,  Cardvedilol for unspecified heart problems.     Patient denies chest pain, shortness of breath, gait problems, or additional medical concerns or complaints at this time.       REVIEW OF SYSTEMS   Review of Systems   Respiratory: Negative for shortness of breath.    Cardiovascular: Negative for chest pain.   Gastrointestinal: Positive for vomiting.   Musculoskeletal: Negative for gait problem.   Neurological: Positive for numbness (left side of the face) and headaches.   All other systems reviewed and are negative.      PAST MEDICAL HISTORY:  Past Medical History:   Diagnosis Date     Asthma        PAST SURGICAL HISTORY:  Past Surgical History:   Procedure Laterality Date     Right-sided tympanomastoidectomy             CURRENT MEDICATIONS:    Current Facility-Administered Medications on File Prior to Encounter   Medication     [DISCONTINUED] sodium chloride flush 10 mL (NS)     Current Outpatient Medications on File Prior to Encounter   Medication Sig     acetaminophen (TYLENOL) 500 MG tablet Take 1,000 mg by mouth every 6 (six) hours as needed for pain.     albuterol (PROVENTIL HFA;VENTOLIN HFA) 90 mcg/actuation inhaler Inhale 2 puffs every 6 (six) hours as needed for wheezing.     amLODIPine (NORVASC) 5 MG tablet Take 5 mg by mouth daily.     fluocinolone 0.01 % Oil Apply 1 application topically 2 (two) times a week. Apply to areas of hair loss twice daily and to entire scalp 1-2 x per week.     ibuprofen (ADVIL,MOTRIN) 200 MG tablet Take 400 mg by mouth every 6 (six) hours as needed for pain.     meclizine (ANTIVERT) 25 mg tablet Take 1 tablet (25 mg total) by mouth 3 (three) times a day as needed.       ALLERGIES:  Allergies   Allergen Reactions     Acetazolamide Itching     Tramadol Itching     Oxycodone      Vicodin [Hydrocodone-Acetaminophen]        FAMILY HISTORY:  No family history on file.    SOCIAL HISTORY:   Social History     Socioeconomic History     Marital status:      Spouse name: Not on file  "    Number of children: Not on file     Years of education: Not on file     Highest education level: Not on file   Occupational History     Not on file   Social Needs     Financial resource strain: Not on file     Food insecurity     Worry: Not on file     Inability: Not on file     Transportation needs     Medical: Not on file     Non-medical: Not on file   Tobacco Use     Smoking status: Never Smoker   Substance and Sexual Activity     Alcohol use: No     Drug use: No     Sexual activity: Not on file   Lifestyle     Physical activity     Days per week: Not on file     Minutes per session: Not on file     Stress: Not on file   Relationships     Social connections     Talks on phone: Not on file     Gets together: Not on file     Attends Anabaptism service: Not on file     Active member of club or organization: Not on file     Attends meetings of clubs or organizations: Not on file     Relationship status: Not on file     Intimate partner violence     Fear of current or ex partner: Not on file     Emotionally abused: Not on file     Physically abused: Not on file     Forced sexual activity: Not on file   Other Topics Concern     Not on file   Social History Narrative     Not on file       VITALS:  Patient Vitals for the past 24 hrs:   BP Temp Pulse Resp SpO2 Height Weight   05/23/21 0000 124/80 -- 93 -- 100 % -- --   05/22/21 2345 125/82 -- 93 16 98 % -- --   05/22/21 2300 121/76 -- 95 16 -- -- --   05/22/21 2134 (!) 162/104 98.4  F (36.9  C) (!) 105 18 100 % 5' 2\" (1.575 m) (!) 235 lb (106.6 kg)     PHYSICAL EXAM    Constitutional:  Well developed, Well nourished, NAD  HENT:  Normocephalic, Atraumatic, Bilateral external ears normal, Oropharynx moist Nose normal.   Neck: Normal range of motion   Eyes: Conjunctiva normal, No discharge.   Respiratory:  Normal breath sounds, No respiratory distress, No wheezing.  No cough.  Cardiovascular:  Normal heart rate, Normal rhythm. No murmur appreciated.  Chest wall " non-tender.  GI:  Soft, No tenderness, No masses, No flank tenderness.  No rebound or guarding.  : No CVA tenderness  Musculoskeletal: Full ROM.  No edema appreciated.  No cyanosis. Good ROM of major joints.  Integument:  Warm, Dry, No erythema, No rash.    Neurologic:  Alert & oriented.  No focal deficits appreciated.  Ambulatory.  Psychiatric:  Affect normal, Judgment normal, Mood normal.     NIH Stroke Scale    Interval: Baseline  Time: 10:38 PM  Person Administering Scale: Alejandro Velasquez MD    Administer stroke scale items in the order listed. Record performance in each category after each subscale exam. Do not go back and change scores. Follow directions provided for each exam technique. Scores should reflect what the patient does, not what the clinician thinks the patient can do. The clinician should record answers while administering the exam and work quickly. Except where indicated, the patient should not be coached (i.e., repeated requests to patient to make a special effort).      1a  Level of consciousness: 0=alert; keenly responsive   1b. LOC questions:  0=Performs both tasks correctly   1c. LOC commands: 0=Performs both tasks correctly   2.  Best Gaze: 0=normal   3.  Visual: 0=No visual loss   4. Facial Palsy: 0=Normal symmetric movement   5a.  Motor left arm: 0=No drift, limb holds 90 (or 45) degrees for full 10 seconds   5b.  Motor right arm: 0=No drift, limb holds 90 (or 45) degrees for full 10 seconds   6a. motor left le=No drift, limb holds 90 (or 45) degrees for full 10 seconds   6b  Motor right le=No drift, limb holds 90 (or 45) degrees for full 10 seconds   7. Limb Ataxia: 0=Absent   8.  Sensory: 0=Normal; no sensory loss   9. Best Language:  0=No aphasia, normal   10. Dysarthria: 0=Normal   11. Extinction and Inattention: 0=No abnormality   12. Distal motor function: 0=Normal    Total:   0       LAB:  All pertinent labs reviewed and interpreted.  Results for orders placed or  performed during the hospital encounter of 05/22/21   HM2(CBC w/o Differential)   Result Value Ref Range    WBC 9.1 4.0 - 11.0 thou/uL    RBC 4.06 3.80 - 5.40 mill/uL    Hemoglobin 11.7 (L) 12.0 - 16.0 g/dL    Hematocrit 36.1 35.0 - 47.0 %    MCV 89 80 - 100 fL    MCH 28.8 27.0 - 34.0 pg    MCHC 32.4 32.0 - 36.0 g/dL    RDW 13.2 11.0 - 14.5 %    Platelets 328 140 - 440 thou/uL    MPV 10.1 8.5 - 12.5 fL   Comprehensive Metabolic Panel   Result Value Ref Range    Sodium 138 136 - 145 mmol/L    Potassium 3.8 3.5 - 5.0 mmol/L    Chloride 103 98 - 107 mmol/L    CO2 28 22 - 31 mmol/L    Anion Gap, Calculation 7 5 - 18 mmol/L    Glucose 118 70 - 125 mg/dL    BUN 9 8 - 22 mg/dL    Creatinine 0.74 0.60 - 1.10 mg/dL    GFR MDRD Af Amer >60 >60 mL/min/1.73m2    GFR MDRD Non Af Amer >60 >60 mL/min/1.73m2    Bilirubin, Total 0.4 0.0 - 1.0 mg/dL    Calcium 9.1 8.5 - 10.5 mg/dL    Protein, Total 8.3 (H) 6.0 - 8.0 g/dL    Albumin 3.8 3.5 - 5.0 g/dL    Alkaline Phosphatase 83 45 - 120 U/L    AST 28 0 - 40 U/L    ALT 43 0 - 45 U/L   Magnesium   Result Value Ref Range    Magnesium 2.0 1.8 - 2.6 mg/dL       RADIOLOGY:  Reviewed all pertinent imaging. Please see official radiology report.  Mr Brain Without Contrast    Result Date: 5/22/2021  EXAM: MR BRAIN WO CONTRAST LOCATION: [facility] DATE/TIME: 5/22/2021 11:36 PM INDICATION: Left sided facial numbness, hypertension COMPARISON: CTA head and neck 05/21/2021 TECHNIQUE: Routine multiplanar multisequence head MRI without intravenous contrast. FINDINGS: INTRACRANIAL CONTENTS: No acute or subacute infarct. No mass, acute hemorrhage, or extra-axial fluid collections. A couple small T2 hyperintensities one each in the left parietal and right frontal white matter. These are nonspecific. Normal ventricles and sulci. Normal position of the cerebellar tonsils. SELLA: No abnormality accounting for technique. OSSEOUS STRUCTURES/SOFT TISSUES: Normal marrow signal. The  major intracranial vascular flow voids are maintained. ORBITS: No abnormality accounting for technique. SINUSES/MASTOIDS: No paranasal sinus mucosal disease. Small to moderate right mastoid effusion.     1.  No acute intracranial abnormality. 2.  A couple small T2 hyperintensities in the subcortical white matter are nonspecific. Findings may represent early senescent changes, other nondescript areas of gliosis, or occasionally can be seen in patients with migraine headaches.      EKG:    Performed at: 2155  EKG Interpretation    Independently interpreted by me    Rhythm: Sinus tachycardia  Rate: 104  Minimal voltage criteria for LVH  Axis: normal  Ectopy: none  Conduction: normal  ST Segments: no acute change  T Waves: no acute change    Clinical Impression: Possible LVH, sinus tachycardia heart rate 104, no change compared to previous ECG from yesterday, otherwise no acute changes      I have independently reviewed and interpreted the EKG(s) documented above.    I, Marilia Rowland, am serving as a scribe to document services personally performed by Dr. Velasquez based on my observation and the provider's statements to me. I, Alejandro Velasquez MD attest that Marilia Rowland is acting in a scribe capacity, has observed my performance of the services and has documented them in accordance with my direction.    Alejandro Velasquez M.D.  Emergency Medicine  [facility] EMERGENCY ROOM  [address] [address]  Dept: [phone]  Loc: [phone]       Alejandro Velasquez MD  05/23/21 0118

## 2021-06-17 NOTE — TELEPHONE ENCOUNTER
"\"I was in the ER yesterday 5/21 see epic. I was started on amlodipine. Tonight my BP is 192/126. I was laying down and all of a sudden I felt the left side of my face and teeth were numb. It was only the left side and my teeth even felt like they were moving. I am not feeling it as much now.\"  Denies chest pain, shortness of breath or other sx.   Triaged and per protocol, advised 911.  COVID 19 Nurse Triage Plan/Patient Instructions    Please be aware that novel coronavirus (COVID-19) may be circulating in the community. If you develop symptoms such as fever, cough, or SOB or if you have concerns about the presence of another infection including coronavirus (COVID-19), please contact your health care provider or visit  [email or web address].    Disposition/Instructions    Call to EMS/911 recommended. Follow protocol based instructions.    Bring Your Own Device:  Please also bring your smart device(s) (smart phones, tablets, laptops) and their charging cables for your personal use and to communicate with your care team during your visit.      Thank you for taking steps to prevent the spread of this virus.  o Limit your contact with others.  o Wear a simple mask to cover your cough.  o Wash your hands well and often.    Resources    [facility]: About COVID-19: [email or web address]    CDC: What to Do If You're Sick: www.cdc.gov/coronavirus/2019-ncov/about/steps-when-sick.html    CDC: Ending Home Isolation: www.cdc.gov/coronavirus/2019-ncov/hcp/disposition-in-home-patients.html     CDC: Caring for Someone: www.cdc.gov/coronavirus/2019-ncov/if-you-are-sick/care-for-someone.html     [facility]: Interim Guidance for Hospital Discharge to Home: www.health.[facility].[place]/diseases/coronavirus/hcp/hospdischarge.pdf    [facility] clinical trials (COVID-19 research studies): [email or web address]     Below are the COVID-19 hotlines at the [place] [facility] ([facility]). " Interpreters are available.   o For health questions: Call 570-355-9394 or 1-573.535.5865 (7 a.m. to 7 p.m.)  o For questions about schools and childcare: Call 091-084-1753 or 1-587.742.7281 (7 a.m. to 7 p.m.)       Reason for Disposition    [1] Numbness (i.e., loss of sensation) of the face, arm or leg on one side of the body AND [2] new onset    Protocols used: HIGH BLOOD PRESSURE-A-AH

## 2021-07-06 VITALS — WEIGHT: 235 LBS | HEIGHT: 62 IN | BODY MASS INDEX: 43.24 KG/M2

## 2021-07-06 VITALS — BODY MASS INDEX: 43.24 KG/M2 | WEIGHT: 235 LBS | HEIGHT: 62 IN

## 2022-05-07 ENCOUNTER — HOSPITAL ENCOUNTER (EMERGENCY)
Facility: CLINIC | Age: 42
Discharge: HOME | End: 2022-05-08
Attending: EMERGENCY MEDICINE | Admitting: EMERGENCY MEDICINE
Payer: COMMERCIAL

## 2022-05-07 DIAGNOSIS — R51.9 ACUTE NONINTRACTABLE HEADACHE, UNSPECIFIED HEADACHE TYPE: ICD-10-CM

## 2022-05-07 PROCEDURE — 250N000011 HC RX IP 250 OP 636: Performed by: EMERGENCY MEDICINE

## 2022-05-07 PROCEDURE — 96374 THER/PROPH/DIAG INJ IV PUSH: CPT | Performed by: EMERGENCY MEDICINE

## 2022-05-07 PROCEDURE — 258N000003 HC RX IP 258 OP 636: Performed by: EMERGENCY MEDICINE

## 2022-05-07 PROCEDURE — 96361 HYDRATE IV INFUSION ADD-ON: CPT | Performed by: EMERGENCY MEDICINE

## 2022-05-07 PROCEDURE — 96375 TX/PRO/DX INJ NEW DRUG ADDON: CPT | Performed by: EMERGENCY MEDICINE

## 2022-05-07 PROCEDURE — 99284 EMERGENCY DEPT VISIT MOD MDM: CPT | Mod: 25 | Performed by: EMERGENCY MEDICINE

## 2022-05-07 RX ORDER — KETOROLAC TROMETHAMINE 15 MG/ML
15 INJECTION, SOLUTION INTRAMUSCULAR; INTRAVENOUS ONCE
Status: COMPLETED | OUTPATIENT
Start: 2022-05-07 | End: 2022-05-07

## 2022-05-07 RX ORDER — DIPHENHYDRAMINE HYDROCHLORIDE 50 MG/ML
25 INJECTION INTRAMUSCULAR; INTRAVENOUS ONCE
Status: COMPLETED | OUTPATIENT
Start: 2022-05-07 | End: 2022-05-07

## 2022-05-07 RX ORDER — METOCLOPRAMIDE HYDROCHLORIDE 5 MG/ML
10 INJECTION INTRAMUSCULAR; INTRAVENOUS ONCE
Status: COMPLETED | OUTPATIENT
Start: 2022-05-07 | End: 2022-05-07

## 2022-05-07 RX ADMIN — METOCLOPRAMIDE HYDROCHLORIDE 10 MG: 5 INJECTION INTRAMUSCULAR; INTRAVENOUS at 23:17

## 2022-05-07 RX ADMIN — DIPHENHYDRAMINE HYDROCHLORIDE 25 MG: 50 INJECTION, SOLUTION INTRAMUSCULAR; INTRAVENOUS at 23:12

## 2022-05-07 RX ADMIN — KETOROLAC TROMETHAMINE 15 MG: 15 INJECTION, SOLUTION INTRAMUSCULAR; INTRAVENOUS at 23:08

## 2022-05-07 RX ADMIN — SODIUM CHLORIDE 1000 ML: 9 INJECTION, SOLUTION INTRAVENOUS at 23:08

## 2022-05-07 ASSESSMENT — ENCOUNTER SYMPTOMS
HEADACHES: 1
PHOTOPHOBIA: 1
NECK PAIN: 1
VOMITING: 1
FEVER: 0
NAUSEA: 1

## 2022-05-07 NOTE — Clinical Note
Nancy Delgado was seen and treated in our emergency department on 5/7/2022.  She may return to work on 05/10/2022.       If you have any questions or concerns, please don't hesitate to call.      Denis Owens, DO

## 2022-05-08 VITALS
BODY MASS INDEX: 40.42 KG/M2 | RESPIRATION RATE: 18 BRPM | DIASTOLIC BLOOD PRESSURE: 85 MMHG | HEART RATE: 76 BPM | SYSTOLIC BLOOD PRESSURE: 144 MMHG | OXYGEN SATURATION: 98 % | WEIGHT: 221 LBS | TEMPERATURE: 97.3 F

## 2022-05-08 PROCEDURE — 250N000011 HC RX IP 250 OP 636: Performed by: EMERGENCY MEDICINE

## 2022-05-08 PROCEDURE — 96375 TX/PRO/DX INJ NEW DRUG ADDON: CPT | Performed by: EMERGENCY MEDICINE

## 2022-05-08 PROCEDURE — 96361 HYDRATE IV INFUSION ADD-ON: CPT | Performed by: EMERGENCY MEDICINE

## 2022-05-08 RX ORDER — DEXAMETHASONE SODIUM PHOSPHATE 10 MG/ML
6 INJECTION, SOLUTION INTRAMUSCULAR; INTRAVENOUS ONCE
Status: COMPLETED | OUTPATIENT
Start: 2022-05-08 | End: 2022-05-08

## 2022-05-08 RX ADMIN — DEXAMETHASONE SODIUM PHOSPHATE 6 MG: 10 INJECTION INTRAMUSCULAR; INTRAVENOUS at 01:01

## 2022-05-08 NOTE — ED PROVIDER NOTES
EMERGENCY DEPARTMENT ENCOUNTER      NAME: Nancy Delgado  AGE: 41 year old female  YOB: 1980  MRN: [id number]  EVALUATION DATE & TIME: 2022 10:40 PM    PCP: Summer Joel    ED PROVIDER: Denis Owens DO      Chief Complaint   Patient presents with     Headache     Vomiting         FINAL IMPRESSION:  1. Acute nonintractable headache, unspecified headache type          ED COURSE & MEDICAL DECISION MAKIN-year-old female with a history of migraine headaches presented to the ED for evaluation of a headache with associated nausea, vomiting, and photophobia that has been ongoing for the last 2 days.  The patient states that the headache feels consistent with her previous headaches.  She denied any other new or concerning symptoms.  Here in the ED the patient was noted to be hypertensive on arrival she was otherwise hemodynamically stable.  She was uncomfortable appearing at the time of her initial evaluation but she did not appear to be acutely ill and she was not in any acute distress.  The patient's physical exam was unremarkable.  The patient had no focal neurologic deficits or meningeal signs.  Following the initial evaluation the patient was given IV Toradol, fluids, Reglan, and Benadryl to treat her headache.    Patient was reevaluated after receiving the IV medications.  The patient states that her pain went from a 11/10 down to a 5 or 6/10.  At the time of reevaluation the patient had received only a small portion of the IV fluids.  The patient was given additional IV Decadron.    Once the IV fluids are finished the patient will be discharged home with instructions to follow-up with her primary care physician for reevaluation as needed or to return back to ED for any worsening headaches or any other new or concerning symptoms.       Pertinent Labs & Imaging studies reviewed. (See chart for details)  10:50 PM I met with the patient to gather history and to perform my  initial exam. We discussed plans for the ED course, including diagnostic testing and treatment.       At the conclusion of the encounter I discussed the results of all of the tests and the disposition. The questions were answered. The patient or family acknowledged understanding and was agreeable with the care plan.       PPE worn: n95 mask, goggles    MEDICATIONS GIVEN IN THE EMERGENCY:  Medications   dexamethasone PF (DECADRON) injection 6 mg (has no administration in time range)   ketorolac (TORADOL) injection 15 mg (15 mg Intravenous Given 5/7/22 2308)   0.9% sodium chloride BOLUS (1,000 mLs Intravenous New Bag 5/7/22 2308)   metoclopramide (REGLAN) injection 10 mg (10 mg Intravenous Given 5/7/22 2317)   diphenhydrAMINE (BENADRYL) injection 25 mg (25 mg Intravenous Given 5/7/22 2312)       NEW PRESCRIPTIONS STARTED AT TODAY'S ER VISIT  New Prescriptions    No medications on file          =================================================================    HPI    Patient information was obtained from: patient     Use of : N/A         Nancy Delgado is a 41 year old female with a pertinent history of HTN, benign intracranial HTN, migraines, who presents to this ED by private car for evaluation of headache.     Patient presents with left sided headache that started on 5/5 (2 days ago). Endorses associated photophobia, phonophobia, nausea, vomiting. Yesterday she had neck pain which has since resolved. Also notes occasional eye floaters since onset of headache, none currently. No other vision changes. She took Tylenol and ibuprofen without relief. Patient reports history of migraines. Current symptoms are consistent with typical migraines, but more severe. Denies fever.     REVIEW OF SYSTEMS   Review of Systems   Constitutional: Negative for fever.   HENT:        Positive for phonophobia.    Eyes: Positive for photophobia and visual disturbance (floaters).   Gastrointestinal: Positive for nausea and  vomiting.   Musculoskeletal: Positive for neck pain (resolved).   Neurological: Positive for headaches.   All other systems reviewed and are negative.    PAST MEDICAL HISTORY:  Past Medical History:   Diagnosis Date     Asthma        PAST SURGICAL HISTORY:  Past Surgical History:   Procedure Laterality Date     OTHER SURGICAL HISTORY      Right-sided tympanomastoidectomy           CURRENT MEDICATIONS:    No current outpatient medications on file.      ALLERGIES:  Allergies   Allergen Reactions     Acetazolamide Itching     Tramadol Itching     Oxycodone Unknown     Vicodin [Hydrocodone-Acetaminophen] Unknown       FAMILY HISTORY:  No family history on file.    SOCIAL HISTORY:   Social History     Socioeconomic History     Marital status:    Tobacco Use     Smoking status: Never Smoker   Substance and Sexual Activity     Alcohol use: No     Drug use: No       VITALS:  BP (!) 161/111   Pulse 79   Temp 97.3  F (36.3  C) (Temporal)   Resp 18   Wt 100.2 kg (221 lb)   SpO2 100%   BMI 40.42 kg/m      PHYSICAL EXAM    General presentation: Uncomfortable appearing. Alert, Vital signs reviewed. NAD  HENT: ENT inspection is normal. Oropharynx is moist and clear.   Eye: Pupils are equal and reactive to light. EOMI  Neck: The neck is supple, with full ROM, with no evidence of meningismus.  Pulmonary: Currently in no acute respiratory distress. Normal, non labored respirations, the lung sounds are normal with good equal air movement. Clear to auscultation bilaterally.   Circulatory: Regular rate and rhythm. Peripheral pulses are strong and equal. No murmurs, rubs, or gallops.   Abdominal: The abdomen is soft. Nontender. No rigidity, guarding, or rebound. Bowel sounds normal.   Neurologic: Alert, oriented to person, place, and time. No motor deficit. No sensory deficit. Cranial nerves II through XII are intact.  Musculoskeletal: No extremity tenderness. Full range of motion in all extremities. No extremity edema.    Skin: Skin color is normal. No rash. Warm. Dry to touch.       I, Cierra Mckinney, am serving as a scribe to document services personally performed by Denis Owens DO based on my observation and the provider's statements to me. I, Denis Owens, attest that Cierra Mckinney is acting in a scribe capacity, has observed my performance of the services and has documented them in accordance with my direction.    Denis Owens DO  Emergency Medicine  [facility] EMERGENCY ROOM  [address] [address]  [phone]     Denis Owens DO  05/08/22 0047

## 2022-05-08 NOTE — ED TRIAGE NOTES
pt presents to the ED with c/o HA since yesterday. PT nauseated and vomiting. Hx of HA/migraine in past. Pt sensitive to light.

## 2022-05-08 NOTE — DISCHARGE INSTRUCTIONS
Your symptoms appear consistent with a migraine like headache.  Continue taking your home medications as needed for any further headaches.  Follow-up with your primary care physician for reevaluation or return back to the ED sooner for any worsening headaches or any other new or concerning symptoms

## 2022-05-22 ENCOUNTER — HOSPITAL ENCOUNTER (EMERGENCY)
Facility: CLINIC | Age: 42
Discharge: HOME | End: 2022-05-22
Attending: EMERGENCY MEDICINE | Admitting: EMERGENCY MEDICINE
Payer: COMMERCIAL

## 2022-05-22 VITALS
WEIGHT: 220 LBS | BODY MASS INDEX: 40.48 KG/M2 | RESPIRATION RATE: 18 BRPM | HEART RATE: 98 BPM | OXYGEN SATURATION: 100 % | HEIGHT: 62 IN | DIASTOLIC BLOOD PRESSURE: 113 MMHG | TEMPERATURE: 98.2 F | SYSTOLIC BLOOD PRESSURE: 166 MMHG

## 2022-05-22 DIAGNOSIS — U07.1 INFECTION DUE TO 2019 NOVEL CORONAVIRUS: ICD-10-CM

## 2022-05-22 LAB
ALBUMIN SERPL-MCNC: 3.7 G/DL (ref 3.5–5)
ALP SERPL-CCNC: 63 U/L (ref 45–120)
ALT SERPL W P-5'-P-CCNC: 16 U/L (ref 0–45)
ANION GAP SERPL CALCULATED.3IONS-SCNC: 8 MMOL/L (ref 5–18)
AST SERPL W P-5'-P-CCNC: 14 U/L (ref 0–40)
BILIRUB SERPL-MCNC: 0.4 MG/DL (ref 0–1)
BUN SERPL-MCNC: 7 MG/DL (ref 8–22)
CALCIUM SERPL-MCNC: 8.9 MG/DL (ref 8.5–10.5)
CHLORIDE BLD-SCNC: 104 MMOL/L (ref 98–107)
CO2 SERPL-SCNC: 23 MMOL/L (ref 22–31)
CREAT SERPL-MCNC: 0.63 MG/DL (ref 0.6–1.1)
ERYTHROCYTE [DISTWIDTH] IN BLOOD BY AUTOMATED COUNT: 12.7 % (ref 10–15)
FLUAV RNA SPEC QL NAA+PROBE: NEGATIVE
FLUBV RNA RESP QL NAA+PROBE: NEGATIVE
GFR SERPL CREATININE-BSD FRML MDRD: >90 ML/MIN/1.73M2
GLUCOSE BLD-MCNC: 112 MG/DL (ref 70–125)
HCG SERPL QL: NEGATIVE
HCT VFR BLD AUTO: 38 % (ref 35–47)
HGB BLD-MCNC: 12.4 G/DL (ref 11.7–15.7)
MCH RBC QN AUTO: 29 PG (ref 26.5–33)
MCHC RBC AUTO-ENTMCNC: 32.6 G/DL (ref 31.5–36.5)
MCV RBC AUTO: 89 FL (ref 78–100)
PLATELET # BLD AUTO: 276 10E3/UL (ref 150–450)
POTASSIUM BLD-SCNC: 3.4 MMOL/L (ref 3.5–5)
PROT SERPL-MCNC: 7.9 G/DL (ref 6–8)
RBC # BLD AUTO: 4.28 10E6/UL (ref 3.8–5.2)
RSV RNA SPEC NAA+PROBE: NEGATIVE
SARS-COV-2 RNA RESP QL NAA+PROBE: POSITIVE
SODIUM SERPL-SCNC: 135 MMOL/L (ref 136–145)
WBC # BLD AUTO: 5.2 10E3/UL (ref 4–11)

## 2022-05-22 PROCEDURE — 96361 HYDRATE IV INFUSION ADD-ON: CPT

## 2022-05-22 PROCEDURE — 96375 TX/PRO/DX INJ NEW DRUG ADDON: CPT

## 2022-05-22 PROCEDURE — 96374 THER/PROPH/DIAG INJ IV PUSH: CPT

## 2022-05-22 PROCEDURE — 258N000003 HC RX IP 258 OP 636: Performed by: EMERGENCY MEDICINE

## 2022-05-22 PROCEDURE — 87637 SARSCOV2&INF A&B&RSV AMP PRB: CPT | Performed by: EMERGENCY MEDICINE

## 2022-05-22 PROCEDURE — C9803 HOPD COVID-19 SPEC COLLECT: HCPCS

## 2022-05-22 PROCEDURE — 250N000011 HC RX IP 250 OP 636: Performed by: EMERGENCY MEDICINE

## 2022-05-22 PROCEDURE — 36415 COLL VENOUS BLD VENIPUNCTURE: CPT | Performed by: EMERGENCY MEDICINE

## 2022-05-22 PROCEDURE — 85027 COMPLETE CBC AUTOMATED: CPT | Performed by: EMERGENCY MEDICINE

## 2022-05-22 PROCEDURE — 80053 COMPREHEN METABOLIC PANEL: CPT | Performed by: EMERGENCY MEDICINE

## 2022-05-22 PROCEDURE — 99284 EMERGENCY DEPT VISIT MOD MDM: CPT | Mod: CS,25

## 2022-05-22 PROCEDURE — 84703 CHORIONIC GONADOTROPIN ASSAY: CPT | Performed by: EMERGENCY MEDICINE

## 2022-05-22 RX ORDER — ONDANSETRON 4 MG/1
4 TABLET, ORALLY DISINTEGRATING ORAL EVERY 8 HOURS PRN
Qty: 10 TABLET | Refills: 0 | Status: SHIPPED | OUTPATIENT
Start: 2022-05-22 | End: 2022-05-25

## 2022-05-22 RX ORDER — ONDANSETRON 2 MG/ML
4 INJECTION INTRAMUSCULAR; INTRAVENOUS ONCE
Status: COMPLETED | OUTPATIENT
Start: 2022-05-22 | End: 2022-05-22

## 2022-05-22 RX ORDER — KETOROLAC TROMETHAMINE 15 MG/ML
15 INJECTION, SOLUTION INTRAMUSCULAR; INTRAVENOUS ONCE
Status: COMPLETED | OUTPATIENT
Start: 2022-05-22 | End: 2022-05-22

## 2022-05-22 RX ADMIN — ONDANSETRON 4 MG: 2 INJECTION INTRAMUSCULAR; INTRAVENOUS at 12:34

## 2022-05-22 RX ADMIN — SODIUM CHLORIDE 1000 ML: 9 INJECTION, SOLUTION INTRAVENOUS at 12:33

## 2022-05-22 RX ADMIN — KETOROLAC TROMETHAMINE 15 MG: 15 INJECTION, SOLUTION INTRAMUSCULAR; INTRAVENOUS at 12:33

## 2022-05-22 ASSESSMENT — ENCOUNTER SYMPTOMS
FEVER: 1
COUGH: 1
EYE REDNESS: 0
SORE THROAT: 1
MYALGIAS: 1
CONFUSION: 0
BRUISES/BLEEDS EASILY: 0
ABDOMINAL PAIN: 0
ARTHRALGIAS: 1
DIFFICULTY URINATING: 0
HEADACHES: 1

## 2022-05-22 NOTE — Clinical Note
Nancy Delgado was seen and treated in our emergency department on 5/22/2022.  She may return to work on 05/27/2022.       If you have any questions or concerns, please don't hesitate to call.      Frank Fiore MD

## 2022-05-22 NOTE — ED TRIAGE NOTES
Patient presents to the ED with complaints of fever, cough, body aches, and vomiting since Tuesday.

## 2022-05-22 NOTE — ED PROVIDER NOTES
EMERGENCY DEPARTMENT ENCOUNTER      NAME: Nancy Delgado  AGE: 41 year old female  YOB: 1980  MRN: [id number]  EVALUATION DATE & TIME: 5/22/2022 12:06 PM    PCP: Summer Joel    ED PROVIDER: Urbano Fiore MD        Chief Complaint   Patient presents with     Generalized Body Aches     Fever         FINAL IMPRESSION:  1. Infection due to 2019 novel coronavirus          ED COURSE & MEDICAL DECISION MAKING:    Pertinent Labs & Imaging studies reviewed. (See chart for details)  41 year old female presents to the Emergency Department for evaluation of viral-like illness most likely COVID-19    Pneumonia, septic joint, PE, DVT unlikely    Meningitis unlikely.     Well-appearing.  No tachypnea, tachycardia, or hypotension    With reports of vomiting will give IV fluids, obtain obtain screening labs, give Toradol, and Zofran through IV and obtain COVID PCR test     Day 6 of illness therefore not a candidate for Paxlovid.     Given IV fluids, Zofran, Toradol with improvement in symptoms    Tolerating oral intake.  COVID-19 positive    Plan for discharge home with pulse ox, recommend virtual recheck with primary care doctor,will send home with zofran to take every 8 hours as needed for vomiting.         At the conclusion of the encounter I discussed the results of all of the tests and the disposition. The questions were answered. The patient or family acknowledged understanding and was agreeable with the care plan.            MEDICATIONS GIVEN IN THE EMERGENCY:  Medications   ondansetron (ZOFRAN) injection 4 mg (4 mg Intravenous Given 5/22/22 1234)   0.9% sodium chloride BOLUS (0 mLs Intravenous Stopped 5/22/22 1405)   ketorolac (TORADOL) injection 15 mg (15 mg Intravenous Given 5/22/22 1233)       NEW PRESCRIPTIONS STARTED AT TODAY'S ER VISIT  Discharge Medication List as of 5/22/2022  2:05 PM      START taking these medications    Details   ondansetron (ZOFRAN ODT) 4 MG ODT tab Take 1 tablet  "(4 mg) by mouth every 8 hours as needed for nausea, Disp-10 tablet, R-0, Local Print                =================================================================    HPI    Triage note  \"Patient presents to the ED with complaints of fever, cough, body aches, and vomiting since Tuesday.      \"      Patient information was obtained from: patient          Nancy Delgado is a 41 year old female with a  who presents to this ED for evaluation of cough, vomiting, joint aches, body aches, fever     Patient's been sick since Tuesday which is 6 days ago and her rapid COVID test 5 days ago was negative.   is currently sick with COVID.  Patient is vaccinated but not boosted.  Is on one medicine for blood pressure and Tylenol medicine and that is it.  Last Tylenol dose last night.    Pain started off in bilateral hips and then spread to bilateral knees followed by fever and body aches and dry cough    Today has been vomiting nonbilious material.  Denies any constipation or dysuria.  Mild headache.  Has a sore throat.      REVIEW OF SYSTEMS   Review of Systems   Constitutional: Positive for fever.   HENT: Positive for sore throat.    Eyes: Negative for redness.   Respiratory: Positive for cough.    Cardiovascular: Negative for chest pain.   Gastrointestinal: Negative for abdominal pain.   Endocrine: Negative for polyuria.   Genitourinary: Negative for difficulty urinating.   Musculoskeletal: Positive for arthralgias and myalgias.   Skin: Negative for rash.   Allergic/Immunologic: Negative for immunocompromised state.   Neurological: Positive for headaches.   Hematological: Does not bruise/bleed easily.   Psychiatric/Behavioral: Negative for confusion.     PAST MEDICAL HISTORY:  Past Medical History:   Diagnosis Date     Asthma        PAST SURGICAL HISTORY:  Past Surgical History:   Procedure Laterality Date     OTHER SURGICAL HISTORY      Right-sided tympanomastoidectomy           CURRENT MEDICATIONS:    ondansetron " "(ZOFRAN ODT) 4 MG ODT tab        ALLERGIES:  Allergies   Allergen Reactions     Acetazolamide Itching     Tramadol Itching     Oxycodone Unknown     Vicodin [Hydrocodone-Acetaminophen] Unknown       FAMILY HISTORY:  No family history on file.    SOCIAL HISTORY:   Social History     Socioeconomic History     Marital status:    Tobacco Use     Smoking status: Never Smoker   Substance and Sexual Activity     Alcohol use: No     Drug use: No       VITALS:  BP (!) 166/113   Pulse 98   Temp 98.2  F (36.8  C) (Oral)   Resp 18   Ht 1.575 m (5' 2\")   Wt 99.8 kg (220 lb)   LMP 05/05/2022 (Approximate)   SpO2 100%   BMI 40.24 kg/m      PHYSICAL EXAM      Vitals: BP (!) 166/113   Pulse 98   Temp 98.2  F (36.8  C) (Oral)   Resp 18   Ht 1.575 m (5' 2\")   Wt 99.8 kg (220 lb)   LMP 05/05/2022 (Approximate)   SpO2 100%   BMI 40.24 kg/m    General: Appears in no acute distress, awake, alert, interactive.  Eyes: Conjunctivae non-injected. Sclera anicteric.  HENT: Atraumatic.  No stridor.  No drooling.  No muffled voice  Neck: Supple.  Full range of motion  Respiratory/Chest: Respiration unlabored.  No wheezing.  No crackles.  No increased work of breathing  Abdomen: non distended.  Abdominal tenderness  Musculoskeletal: Normal extremities. No edema or erythema.  Skin: Normal color. No rash or diaphoresis.  Neurologic: Face symmetric, moves all extremities spontaneously. Speech clear.  Psychiatric: Oriented to person, place, and time. Affect appropriate.       LAB:  All pertinent labs reviewed and interpreted.  Results for orders placed or performed during the hospital encounter of 05/22/22   CBC (+ platelets, no diff)   Result Value Ref Range    WBC Count 5.2 4.0 - 11.0 10e3/uL    RBC Count 4.28 3.80 - 5.20 10e6/uL    Hemoglobin 12.4 11.7 - 15.7 g/dL    Hematocrit 38.0 35.0 - 47.0 %    MCV 89 78 - 100 fL    MCH 29.0 26.5 - 33.0 pg    MCHC 32.6 31.5 - 36.5 g/dL    RDW 12.7 10.0 - 15.0 %    Platelet Count 276 150 " - 450 10e3/uL   Comprehensive metabolic panel   Result Value Ref Range    Sodium 135 (L) 136 - 145 mmol/L    Potassium 3.4 (L) 3.5 - 5.0 mmol/L    Chloride 104 98 - 107 mmol/L    Carbon Dioxide (CO2) 23 22 - 31 mmol/L    Anion Gap 8 5 - 18 mmol/L    Urea Nitrogen 7 (L) 8 - 22 mg/dL    Creatinine 0.63 0.60 - 1.10 mg/dL    Calcium 8.9 8.5 - 10.5 mg/dL    Glucose 112 70 - 125 mg/dL    Alkaline Phosphatase 63 45 - 120 U/L    AST 14 0 - 40 U/L    ALT 16 0 - 45 U/L    Protein Total 7.9 6.0 - 8.0 g/dL    Albumin 3.7 3.5 - 5.0 g/dL    Bilirubin Total 0.4 0.0 - 1.0 mg/dL    GFR Estimate >90 >60 mL/min/1.73m2   Symptomatic; Unknown Influenza A/B & SARS-CoV2 (COVID-19) Virus PCR Multiplex Nasopharyngeal    Specimen: Nasopharyngeal; Swab   Result Value Ref Range    Influenza A PCR Negative Negative    Influenza B PCR Negative Negative    RSV PCR Negative Negative    SARS CoV2 PCR Positive (A) Negative   HCG QUALitative pregnancy (blood)   Result Value Ref Range    hCG Serum Qualitative Negative Negative       RADIOLOGY:  Reviewed all pertinent imaging. Please see official radiology report.  No orders to display             Frank Fiore MD  Emergency Medicine  [facility] EMERGENCY ROOM  [address] [facility] [address] [phone]       Frank Fiore MD  05/22/22 0686

## 2023-01-08 ENCOUNTER — APPOINTMENT (OUTPATIENT)
Dept: CT IMAGING | Facility: CLINIC | Age: 43
End: 2023-01-08
Attending: EMERGENCY MEDICINE
Payer: COMMERCIAL

## 2023-01-08 ENCOUNTER — APPOINTMENT (OUTPATIENT)
Dept: RADIOLOGY | Facility: CLINIC | Age: 43
End: 2023-01-08
Attending: EMERGENCY MEDICINE
Payer: COMMERCIAL

## 2023-01-08 ENCOUNTER — HOSPITAL ENCOUNTER (EMERGENCY)
Facility: CLINIC | Age: 43
Discharge: HOME | End: 2023-01-08
Attending: EMERGENCY MEDICINE | Admitting: EMERGENCY MEDICINE
Payer: COMMERCIAL

## 2023-01-08 VITALS
BODY MASS INDEX: 42.33 KG/M2 | TEMPERATURE: 98.3 F | HEART RATE: 77 BPM | HEIGHT: 62 IN | RESPIRATION RATE: 16 BRPM | OXYGEN SATURATION: 96 % | WEIGHT: 230 LBS | SYSTOLIC BLOOD PRESSURE: 145 MMHG | DIASTOLIC BLOOD PRESSURE: 96 MMHG

## 2023-01-08 DIAGNOSIS — R42 DIZZINESS: ICD-10-CM

## 2023-01-08 DIAGNOSIS — R51.9 NONINTRACTABLE EPISODIC HEADACHE, UNSPECIFIED HEADACHE TYPE: ICD-10-CM

## 2023-01-08 LAB
ALBUMIN SERPL-MCNC: 4 G/DL (ref 3.5–5)
ALP SERPL-CCNC: 64 U/L (ref 45–120)
ALT SERPL W P-5'-P-CCNC: 12 U/L (ref 0–45)
ANION GAP SERPL CALCULATED.3IONS-SCNC: 11 MMOL/L (ref 5–18)
AST SERPL W P-5'-P-CCNC: 10 U/L (ref 0–40)
BASOPHILS # BLD AUTO: 0 10E3/UL (ref 0–0.2)
BASOPHILS NFR BLD AUTO: 0 %
BILIRUB SERPL-MCNC: 0.4 MG/DL (ref 0–1)
BUN SERPL-MCNC: 11 MG/DL (ref 8–22)
CALCIUM SERPL-MCNC: 9.5 MG/DL (ref 8.5–10.5)
CHLORIDE BLD-SCNC: 104 MMOL/L (ref 98–107)
CO2 SERPL-SCNC: 22 MMOL/L (ref 22–31)
CREAT SERPL-MCNC: 0.72 MG/DL (ref 0.6–1.1)
EOSINOPHIL # BLD AUTO: 0 10E3/UL (ref 0–0.7)
EOSINOPHIL NFR BLD AUTO: 0 %
ERYTHROCYTE [DISTWIDTH] IN BLOOD BY AUTOMATED COUNT: 13 % (ref 10–15)
GFR SERPL CREATININE-BSD FRML MDRD: >90 ML/MIN/1.73M2
GLUCOSE BLD-MCNC: 119 MG/DL (ref 70–125)
HCG SERPL QL: NEGATIVE
HCT VFR BLD AUTO: 41.4 % (ref 35–47)
HGB BLD-MCNC: 13.4 G/DL (ref 11.7–15.7)
HOLD SPECIMEN: NORMAL
IMM GRANULOCYTES # BLD: 0.1 10E3/UL
IMM GRANULOCYTES NFR BLD: 0 %
LYMPHOCYTES # BLD AUTO: 3 10E3/UL (ref 0.8–5.3)
LYMPHOCYTES NFR BLD AUTO: 21 %
MCH RBC QN AUTO: 28.9 PG (ref 26.5–33)
MCHC RBC AUTO-ENTMCNC: 32.4 G/DL (ref 31.5–36.5)
MCV RBC AUTO: 89 FL (ref 78–100)
MONOCYTES # BLD AUTO: 0.6 10E3/UL (ref 0–1.3)
MONOCYTES NFR BLD AUTO: 4 %
NEUTROPHILS # BLD AUTO: 10.6 10E3/UL (ref 1.6–8.3)
NEUTROPHILS NFR BLD AUTO: 75 %
NRBC # BLD AUTO: 0 10E3/UL
NRBC BLD AUTO-RTO: 0 /100
PLATELET # BLD AUTO: 325 10E3/UL (ref 150–450)
POTASSIUM BLD-SCNC: 3.8 MMOL/L (ref 3.5–5)
PROT SERPL-MCNC: 8.5 G/DL (ref 6–8)
RBC # BLD AUTO: 4.63 10E6/UL (ref 3.8–5.2)
SODIUM SERPL-SCNC: 137 MMOL/L (ref 136–145)
TROPONIN I SERPL-MCNC: <0.01 NG/ML (ref 0–0.29)
TSH SERPL DL<=0.005 MIU/L-ACNC: 0.84 UIU/ML (ref 0.3–5)
WBC # BLD AUTO: 14.3 10E3/UL (ref 4–11)

## 2023-01-08 PROCEDURE — 96361 HYDRATE IV INFUSION ADD-ON: CPT

## 2023-01-08 PROCEDURE — 258N000003 HC RX IP 258 OP 636: Performed by: EMERGENCY MEDICINE

## 2023-01-08 PROCEDURE — 70450 CT HEAD/BRAIN W/O DYE: CPT

## 2023-01-08 PROCEDURE — 71046 X-RAY EXAM CHEST 2 VIEWS: CPT

## 2023-01-08 PROCEDURE — 84703 CHORIONIC GONADOTROPIN ASSAY: CPT | Performed by: EMERGENCY MEDICINE

## 2023-01-08 PROCEDURE — 85025 COMPLETE CBC W/AUTO DIFF WBC: CPT | Performed by: EMERGENCY MEDICINE

## 2023-01-08 PROCEDURE — 99285 EMERGENCY DEPT VISIT HI MDM: CPT | Mod: 25

## 2023-01-08 PROCEDURE — 96375 TX/PRO/DX INJ NEW DRUG ADDON: CPT

## 2023-01-08 PROCEDURE — 93005 ELECTROCARDIOGRAM TRACING: CPT | Performed by: EMERGENCY MEDICINE

## 2023-01-08 PROCEDURE — 80053 COMPREHEN METABOLIC PANEL: CPT | Performed by: EMERGENCY MEDICINE

## 2023-01-08 PROCEDURE — 84443 ASSAY THYROID STIM HORMONE: CPT | Performed by: EMERGENCY MEDICINE

## 2023-01-08 PROCEDURE — 250N000011 HC RX IP 250 OP 636: Performed by: EMERGENCY MEDICINE

## 2023-01-08 PROCEDURE — 96374 THER/PROPH/DIAG INJ IV PUSH: CPT

## 2023-01-08 PROCEDURE — 84484 ASSAY OF TROPONIN QUANT: CPT | Performed by: EMERGENCY MEDICINE

## 2023-01-08 PROCEDURE — 36415 COLL VENOUS BLD VENIPUNCTURE: CPT | Performed by: EMERGENCY MEDICINE

## 2023-01-08 RX ORDER — DIPHENHYDRAMINE HYDROCHLORIDE 50 MG/ML
25 INJECTION INTRAMUSCULAR; INTRAVENOUS ONCE
Status: COMPLETED | OUTPATIENT
Start: 2023-01-08 | End: 2023-01-08

## 2023-01-08 RX ORDER — METOCLOPRAMIDE HYDROCHLORIDE 5 MG/ML
10 INJECTION INTRAMUSCULAR; INTRAVENOUS ONCE
Status: COMPLETED | OUTPATIENT
Start: 2023-01-08 | End: 2023-01-08

## 2023-01-08 RX ORDER — METOCLOPRAMIDE 10 MG/1
10 TABLET ORAL 3 TIMES DAILY PRN
Qty: 30 TABLET | Refills: 0 | Status: SHIPPED | OUTPATIENT
Start: 2023-01-08

## 2023-01-08 RX ORDER — KETOROLAC TROMETHAMINE 15 MG/ML
15 INJECTION, SOLUTION INTRAMUSCULAR; INTRAVENOUS ONCE
Status: DISCONTINUED | OUTPATIENT
Start: 2023-01-08 | End: 2023-01-08 | Stop reason: HOSPADM

## 2023-01-08 RX ADMIN — METOCLOPRAMIDE HYDROCHLORIDE 10 MG: 5 INJECTION INTRAMUSCULAR; INTRAVENOUS at 11:22

## 2023-01-08 RX ADMIN — SODIUM CHLORIDE 1000 ML: 9 INJECTION, SOLUTION INTRAVENOUS at 11:21

## 2023-01-08 RX ADMIN — DIPHENHYDRAMINE HYDROCHLORIDE 25 MG: 50 INJECTION INTRAMUSCULAR; INTRAVENOUS at 11:22

## 2023-01-08 ASSESSMENT — ENCOUNTER SYMPTOMS
HEADACHES: 1
SHORTNESS OF BREATH: 1
DIZZINESS: 1
PALPITATIONS: 1
FEVER: 0
ABDOMINAL PAIN: 0

## 2023-01-08 ASSESSMENT — ACTIVITIES OF DAILY LIVING (ADL)
ADLS_ACUITY_SCORE: 35
ADLS_ACUITY_SCORE: 35

## 2023-01-08 NOTE — ED PROVIDER NOTES
EMERGENCY DEPARTMENT ENCOUNTER      NAME: Nancy Delgado  AGE: 42 year old female  YOB: 1980  MRN: [id number]  EVALUATION DATE & TIME: 1/8/2023 10:43 AM    PCP: Summer Jeol    ED PROVIDER: Cheryl Stephenson DO      Chief Complaint   Patient presents with     Headache         FINAL IMPRESSION:  1. Nonintractable episodic headache, unspecified headache type    2. Dizziness          ED COURSE & MEDICAL DECISION MAKING:    Pertinent Labs & Imaging studies reviewed. (See chart for details)  10:57 AM I met the patient and performed my initial interview and exam.  1:21 PM I updated the patient about her imaging and lab results. She is feeling better. We discussed plans for discharge.     42 year old female presents to the Emergency Department for evaluation of headache, dizziness, palpitations, chest pain.  Patient reports symptoms around a week.  She has a history of chronic migraines.  She does see neurology for this.  She is on Emgality.  Headache currently located on her left side similar nature to prior headaches.  She is noted sensation of room spinning dizziness, nausea and vomiting which is new.  No fevers or chills.  No neck rigidity to suggest meningitis.  No focal neurologic deficits.  No significant temporal pain to suggest temporal arteritis.  She also notes chest pain.  Comes and goes.  Feels like a pressure on her left side.  No exertional component.  Not consistent with ACS, however will screen EKG and troponin.  EKG shows sinus rhythm with PVCs.  She has history of PVCs in the past, seen cardiology for palpitations and started on carvedilol.  No other evidence of arrhythmia.  Plan for symptom control, labs, CT imaging of the head.  Her TMs are normal bilaterally.  Testing is reassuring here.  She had improvement in her symptoms after IV fluids and headache cocktail.  Discussed results with patient and her significant other.  Will discharge with metoclopramide and Benadryl as  needed, encourage close follow-up with her primary doctor.    At the conclusion of the encounter I discussed the results of all of the tests and the disposition. The questions were answered. The patient or family acknowledged understanding and was agreeable with the care plan.     Medical Decision Making    History:    Supplemental history from: Documented in HPI, if applicable    External Record(s) reviewed: Outpatient Record: .    Work Up:    Chart documentation includes differential considered and any EKGs or imaging independently interpreted by provider.    In additional to work up documented, I considered the following work up: See chart documentation, if applicable.    External consultation:    Discussion of management with another provider: See chart documentation, if applicable    Complicating factors:    Care impacted by chronic illness: N/A    Care affected by social determinants of health: N/A    Disposition considerations: Discharge. I prescribed additional prescription strength medication(s) as charted. N/A.        MEDICATIONS GIVEN IN THE EMERGENCY:  Medications   ketorolac (TORADOL) injection 15 mg (has no administration in time range)   0.9% sodium chloride BOLUS (0 mLs Intravenous Stopped 1/8/23 1346)   metoclopramide (REGLAN) injection 10 mg (10 mg Intravenous Given 1/8/23 1122)   diphenhydrAMINE (BENADRYL) injection 25 mg (25 mg Intravenous Given 1/8/23 1122)       NEW PRESCRIPTIONS STARTED AT TODAY'S ER VISIT  Discharge Medication List as of 1/8/2023  1:46 PM      START taking these medications    Details   metoclopramide (REGLAN) 10 MG tablet Take 1 tablet (10 mg) by mouth 3 times daily as needed (headache and nausea), Disp-30 tablet, R-0, Local PrintI would recommend taking this with 25 mg benadryl                =================================================================    [facility] [place]    Patient information was obtained from: Patient     Use of : N/A         Nancy Delgado is a 42  year old female with a pertinent history of migraines, balance problem, dizziness, myofascial pain syndrome, asthma, PVC, morbid obesity and hypertension who presents to this ED via walk in with  for evaluation of headache.     The patient presents with complains about worsening migraine headache. She endorses dizziness, palpitations, chest pain and shortness of breath. With the dizziness, she reports that everything is moving with her. Dizziness worsen when moving eyes from side to side. She feels her heart is skipping a beat and intermittent left chest pain that feels like pressure. A couple days ago she had some vision changes due to having a headache but none currently. Took over the counter tylenol with minimal relief. She reports starting new medication on 1/4 of methylprednisolone which she believes has worsen her symptoms. She has an IUD in place. Denies history of blood clots. The patient denies any leg swelling, fever, abdominal pain, and any other complaints or concerns at the moment       REVIEW OF SYSTEMS   Review of Systems   Constitutional: Negative for fever.   Respiratory: Positive for shortness of breath.    Cardiovascular: Positive for chest pain (left) and palpitations (skipping a beat). Negative for leg swelling.   Gastrointestinal: Negative for abdominal pain.   Neurological: Positive for dizziness and headaches.   All other systems reviewed and are negative.       PAST MEDICAL HISTORY:  Past Medical History:   Diagnosis Date     Asthma        PAST SURGICAL HISTORY:  Past Surgical History:   Procedure Laterality Date     OTHER SURGICAL HISTORY      Right-sided tympanomastoidectomy           CURRENT MEDICATIONS:    metoclopramide (REGLAN) 10 MG tablet         ALLERGIES:  Allergies   Allergen Reactions     Acetazolamide Itching     Tramadol Itching     Oxycodone Unknown     Vicodin [Hydrocodone-Acetaminophen] Unknown       FAMILY HISTORY:  No family history on file.    SOCIAL HISTORY:  "  Social History     Socioeconomic History     Marital status:    Tobacco Use     Smoking status: Never   Substance and Sexual Activity     Alcohol use: No     Drug use: No       VITALS:  BP (!) 145/96   Pulse 77   Temp 98.3  F (36.8  C) (Oral)   Resp 16   Ht 1.575 m (5' 2\")   Wt 104.3 kg (230 lb)   LMP 12/06/2022   SpO2 96%   BMI 42.07 kg/m      PHYSICAL EXAM    Physical Exam  Constitutional:       General: She is not in acute distress.  HENT:      Head: Normocephalic and atraumatic.      Right Ear: Tympanic membrane normal.      Left Ear: Tympanic membrane normal.      Mouth/Throat:      Pharynx: Oropharynx is clear.   Eyes:      Pupils: Pupils are equal, round, and reactive to light.   Cardiovascular:      Rate and Rhythm: Normal rate and regular rhythm.      Pulses: Normal pulses.      Heart sounds: Normal heart sounds.   Pulmonary:      Effort: Pulmonary effort is normal.      Breath sounds: Normal breath sounds.   Abdominal:      General: Abdomen is flat. Bowel sounds are normal.      Palpations: Abdomen is soft.      Tenderness: There is no abdominal tenderness.   Musculoskeletal:         General: Normal range of motion.      Cervical back: No rigidity.   Skin:     General: Skin is warm and dry.      Capillary Refill: Capillary refill takes less than 2 seconds.   Neurological:      General: No focal deficit present.      Mental Status: She is alert and oriented to person, place, and time.      Cranial Nerves: Cranial nerves 2-12 are intact.      Sensory: Sensation is intact.      Motor: Motor function is intact.      Coordination: Coordination is intact.      Gait: Gait is intact.        LAB:  All pertinent labs reviewed and interpreted.  Labs Ordered and Resulted from Time of ED Arrival to Time of ED Departure   COMPREHENSIVE METABOLIC PANEL - Abnormal       Result Value    Sodium 137      Potassium 3.8      Chloride 104      Carbon Dioxide (CO2) 22      Anion Gap 11      Urea Nitrogen 11      " Creatinine 0.72      Calcium 9.5      Glucose 119      Alkaline Phosphatase 64      AST 10      ALT 12      Protein Total 8.5 (*)     Albumin 4.0      Bilirubin Total 0.4      GFR Estimate >90     CBC WITH PLATELETS AND DIFFERENTIAL - Abnormal    WBC Count 14.3 (*)     RBC Count 4.63      Hemoglobin 13.4      Hematocrit 41.4      MCV 89      MCH 28.9      MCHC 32.4      RDW 13.0      Platelet Count 325      % Neutrophils 75      % Lymphocytes 21      % Monocytes 4      % Eosinophils 0      % Basophils 0      % Immature Granulocytes 0      NRBCs per 100 WBC 0      Absolute Neutrophils 10.6 (*)     Absolute Lymphocytes 3.0      Absolute Monocytes 0.6      Absolute Eosinophils 0.0      Absolute Basophils 0.0      Absolute Immature Granulocytes 0.1      Absolute NRBCs 0.0     TROPONIN I - Normal    Troponin I <0.01     TSH WITH FREE T4 REFLEX - Normal    TSH 0.84     HCG QUALITATIVE PREGNANCY - Normal    hCG Serum Qualitative Negative         RADIOLOGY:  Reviewed all pertinent imaging. Please see official radiology report.  Head CT w/o contrast   Final Result   IMPRESSION:   1.  No acute intracranial process.         Chest XR,  PA & LAT   Final Result   IMPRESSION: Negative chest. No substantial change.          EKG:    Performed at: 08-JAN-2023 10:56    Impression: Sinus rhythm with occasional premature ventricular complexes. Minimal voltage criteria for LVH, may be normal variant.     Rate: 75  Rhythm: Sinus rhythm  Axis: -11  [place] Interval: 148  QRS Interval: 76  QTc Interval: 426  ST Changes: none  Comparison: When compared with ECG of 22-MAY-2021 21:55, premature ventricular complexes are now presents. Borderline criteria for Anterior infarct and Anterolateral infarct are no longer present.     I have independently reviewed and interpreted the EKG(s) documented above.      I, Cyndi Her, am serving as a scribe to document services personally performed by Dr. Cheryl Stephenson based on my observation and the provider's  statements to me. I, Cheryl Stephenson DO attest that Cyndi Her is acting in a scribe capacity, has observed my performance of the services and has documented them in accordance with my direction.    Cheryl Stephenson DO  Emergency Medicine  [facility] EMERGENCY ROOM  [address] [address]  [phone]  Dept: [phone]       Cheryl Stephenson DO  01/08/23 1531       Cheryl Stephenson DO  01/08/23 1531

## 2023-01-08 NOTE — Clinical Note
Juve Delgado accompanied Nancy Delgado to the emergency department on 1/8/2023. They may return to work on 01/09/2023.      If you have any questions or concerns, please don't hesitate to call.      Ohl, Cheryl TRIVEDI, DO

## 2023-01-08 NOTE — DISCHARGE INSTRUCTIONS
Continue home medications  Fluid hydrate, rest  Metoclopramide and Benadryl as needed for ongoing headaches or nausea vomiting  Follow closely with your primary fighter  Return if any uncontrolled symptoms

## 2023-01-09 LAB
ATRIAL RATE - MUSE: 75 BPM
DIASTOLIC BLOOD PRESSURE - MUSE: NORMAL MMHG
INTERPRETATION ECG - MUSE: NORMAL
P AXIS - MUSE: 34 DEGREES
PR INTERVAL - MUSE: 148 MS
QRS DURATION - MUSE: 76 MS
QT - MUSE: 382 MS
QTC - MUSE: 426 MS
R AXIS - MUSE: -11 DEGREES
SYSTOLIC BLOOD PRESSURE - MUSE: NORMAL MMHG
T AXIS - MUSE: 23 DEGREES
VENTRICULAR RATE- MUSE: 75 BPM

## 2023-02-25 ENCOUNTER — HOSPITAL ENCOUNTER (EMERGENCY)
Facility: CLINIC | Age: 43
Discharge: HOME | End: 2023-02-25
Admitting: NURSE PRACTITIONER
Payer: COMMERCIAL

## 2023-02-25 VITALS
DIASTOLIC BLOOD PRESSURE: 110 MMHG | SYSTOLIC BLOOD PRESSURE: 179 MMHG | TEMPERATURE: 97.5 F | RESPIRATION RATE: 20 BRPM | HEART RATE: 88 BPM | BODY MASS INDEX: 41.7 KG/M2 | OXYGEN SATURATION: 100 % | WEIGHT: 228 LBS

## 2023-02-25 DIAGNOSIS — R11.10 VOMITING: ICD-10-CM

## 2023-02-25 DIAGNOSIS — R03.0 ELEVATED BLOOD PRESSURE READING: ICD-10-CM

## 2023-02-25 DIAGNOSIS — J06.9 VIRAL URI WITH COUGH: ICD-10-CM

## 2023-02-25 PROBLEM — I10 ESSENTIAL HYPERTENSION: Status: ACTIVE | Noted: 2020-02-26

## 2023-02-25 PROBLEM — G43.009 MIGRAINE WITHOUT AURA AND WITHOUT STATUS MIGRAINOSUS, NOT INTRACTABLE: Status: ACTIVE | Noted: 2020-02-26

## 2023-02-25 PROBLEM — E66.01 MORBID OBESITY (H): Status: ACTIVE | Noted: 2021-04-21

## 2023-02-25 PROBLEM — I49.3 PVC (PREMATURE VENTRICULAR CONTRACTION): Status: ACTIVE | Noted: 2021-04-21

## 2023-02-25 LAB
FLUAV RNA SPEC QL NAA+PROBE: NEGATIVE
FLUBV RNA RESP QL NAA+PROBE: NEGATIVE
RSV RNA SPEC NAA+PROBE: NEGATIVE
SARS-COV-2 RNA RESP QL NAA+PROBE: NEGATIVE

## 2023-02-25 PROCEDURE — C9803 HOPD COVID-19 SPEC COLLECT: HCPCS

## 2023-02-25 PROCEDURE — 250N000013 HC RX MED GY IP 250 OP 250 PS 637: Performed by: NURSE PRACTITIONER

## 2023-02-25 PROCEDURE — 87637 SARSCOV2&INF A&B&RSV AMP PRB: CPT | Performed by: NURSE PRACTITIONER

## 2023-02-25 PROCEDURE — 99283 EMERGENCY DEPT VISIT LOW MDM: CPT | Mod: CS

## 2023-02-25 PROCEDURE — 250N000011 HC RX IP 250 OP 636: Performed by: NURSE PRACTITIONER

## 2023-02-25 RX ORDER — ONDANSETRON 4 MG/1
4 TABLET, ORALLY DISINTEGRATING ORAL ONCE
Status: COMPLETED | OUTPATIENT
Start: 2023-02-25 | End: 2023-02-25

## 2023-02-25 RX ORDER — ONDANSETRON 4 MG/1
4 TABLET, ORALLY DISINTEGRATING ORAL EVERY 8 HOURS PRN
Qty: 10 TABLET | Refills: 0 | Status: SHIPPED | OUTPATIENT
Start: 2023-02-25 | End: 2023-02-28

## 2023-02-25 RX ORDER — AMLODIPINE BESYLATE 5 MG/1
TABLET ORAL
COMMUNITY
Start: 2023-02-20 | End: 2023-02-25

## 2023-02-25 RX ORDER — UBROGEPANT 50 MG/1
TABLET ORAL
COMMUNITY
Start: 2022-11-21

## 2023-02-25 RX ORDER — FLUTICASONE PROPIONATE 44 UG/1
2 AEROSOL, METERED RESPIRATORY (INHALATION)
COMMUNITY
Start: 2023-01-12

## 2023-02-25 RX ORDER — CARVEDILOL 25 MG/1
TABLET ORAL
COMMUNITY
Start: 2023-02-20 | End: 2023-02-25

## 2023-02-25 RX ORDER — ACETAMINOPHEN 500 MG
1000 TABLET ORAL ONCE
Status: COMPLETED | OUTPATIENT
Start: 2023-02-25 | End: 2023-02-25

## 2023-02-25 RX ADMIN — ACETAMINOPHEN 1000 MG: 500 TABLET ORAL at 13:31

## 2023-02-25 RX ADMIN — ONDANSETRON 4 MG: 4 TABLET, ORALLY DISINTEGRATING ORAL at 13:31

## 2023-02-25 NOTE — DISCHARGE INSTRUCTIONS
Discharge Instructions  Upper Respiratory Infection    Your COVID, influenza, RSV test are negative.  You should retest for COVID in 2 to 3 days at home.    Continue symptom management.  Rest and stay well-hydrated.  Take the ondansetron for nausea/vomiting.    The upper respiratory tract includes the sinuses, nasal passages, pharynx, and larynx. A URI, or upper respiratory infection, is an infection of any of the parts of the upper airway. Symptoms include runny nose, congestion, sore throat, cough, and fever. URIs are almost always caused by a virus. Antibiotics do not help with virus infections, so are not used for an ordinary URI. A URI is very contagious through coughing and nasal secretions; make sure you wash your hands often and clean surfaces after sneezing, coughing or touching them.  Viruses can live on surfaces for up to 3 days.      Return to the Emergency Department if:  Any of the symptoms you have get much worse.  You seem very sick, like being too weak to get up.  You have any new symptoms, especially serious things like chest pain.   You are short of breath.   You have a severe headache.  You are vomiting so much you can t keep fluids or medicines down.  You have confusion or seem unusually drowsy.  You have a seizure or convulsion.    Follow-up:    You should start to improve in 3 - 5 days.  A cough can linger for up to six weeks, but overall you should be feeling much better.  See your doctor if you have a fever for more than 3 days, or if you are not feeling better within 5 days.      What can I do to help myself?  Fill any prescriptions the doctor gave you and take them right away  If you have a fever, get plenty of rest and drink lots of fluids, especially water. Using a humidifier or saline nose spray will also help loosen secretions.   What clothes or blankets you have on won t change your fever. Do what is comfortable for you.  Bathing or sponging in lukewarm water may help you feel  better.  Tylenol  (acetaminophen), Motrin  (ibuprofen), or Advil  (ibuprofen) help bring fever down and may help you feel more comfortable. Be sure to read and follow the package directions, and ask your doctor if you have questions.  Do not drink alcohol.  Decongestants may help you feel better. You may use decongestant nose sprays Afrin  (oxymetazoline) or Manjeet-Synephrine  (phenylephrine hydrochloride) for up to 3 days, or may use a decongestant tablet like Sudafed  (pseudoephedrine).  If you were given a prescription for medicine here today, be sure to read all of the information (including the package insert) that comes with your prescription.  This will include important information about the medicine, its side effects, and any warnings that you need to know about.  The pharmacist who fills the prescription can provide more information and answer questions you may have about the medicine.  If you have questions or concerns that the pharmacist cannot address, please call or return to the Emergency Department.     Remember that you can always come back to the Emergency Department if you are not able to see your regular doctor in the amount of time listed above, if you get any new symptoms, or if there is anything that worries you.

## 2023-02-25 NOTE — ED PROVIDER NOTES
EMERGENCY DEPARTMENT ENCOUNTER      NAME: Nancy Delgado  AGE: 42 year old female  YOB: 1980  MRN: [id number]  EVALUATION DATE & TIME: 2023  1:05 PM    PCP: Summer Joel    ED PROVIDER: ZENAIDA Berrios, CNP      Chief Complaint   Patient presents with     Cough     Vomiting     Headache     Pharyngitis         FINAL IMPRESSION:  1. Elevated blood pressure reading    2. Viral URI with cough    3. Vomiting          ED COURSE & MEDICAL DECISION MAKIN:15 PM I met with the patient, obtained history, performed an initial exam, and discussed options and plan for treatment here in the ED.  2:26 PM updated patient    Pertinent Labs & Imaging studies reviewed. (See chart for details)  42 year old female presents to the Emergency Department for evaluation of cough and vomiting.  Overall well-appearing.  Elevated blood pressure but otherwise normal vital signs.  Lungs clear to auscultation.  Testing for influenza, COVID, RSV negative.  Recommend that she repeat a home COVID test in 2 to 3 days.  Given ondansetron here.  Considered checking electrolytes and giving IV fluids, however she did not clinically appear dehydrated and was not tachycardic.  She did feel better after the ondansetron.  Prescription for ondansetron provided.  Recommend she continue with symptomatic management for presumed viral illness.  Clinic follow-up within 1 week recommended.  Also provided with return precautions      Medical Decision Making    History:    Supplemental history from: Documented in chart, if applicable    External Record(s) reviewed: Documented in chart, if applicable.    Work Up:    Chart documentation includes differential considered and any EKGs or imaging independently interpreted by provider, where specified.    In additional to work up documented, I considered the following work up: Documented in chart, if applicable.    External consultation:    Discussion of management with  another provider: Documented in chart, if applicable    Complicating factors:    Care impacted by chronic illness: Chronic Lung Disease and Hypertension    Care affected by social determinants of health: N/A    Disposition considerations: Discharge. I prescribed additional prescription strength medication(s) as charted. See documentation for any additional details.        At the conclusion of the encounter I discussed the results of all of the tests and the disposition. The questions were answered. The patient or family acknowledged understanding and was agreeable with the care plan.       0 minutes of critical care time     MEDICATIONS GIVEN IN THE EMERGENCY:  Medications   ondansetron (ZOFRAN ODT) ODT tab 4 mg (4 mg Oral $Given 2/25/23 1331)   acetaminophen (TYLENOL) tablet 1,000 mg (1,000 mg Oral $Given 2/25/23 1331)       NEW PRESCRIPTIONS STARTED AT TODAY'S ER VISIT  New Prescriptions    ONDANSETRON (ZOFRAN ODT) 4 MG ODT TAB    Take 1 tablet (4 mg) by mouth every 8 hours as needed for nausea            =================================================================    Patient information was obtained from: Patient    Use of Intrepreter: N/A    Limitations to History: None    HPI    Nancy Delgado is a 42 year old female with a history of HTN and asthma who presents for evaluation of cough and vomiting. Symptoms began 3 days ago.  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  Cough has been nonproductive.  Notes associated sore throat.  No dyspnea.  No fever.  Yesterday started vomiting.  States she vomits anytime she tries to eat or drink anything.  Had some loose stool yesterday but none today.  No abdominal pain.    PAST MEDICAL HISTORY:  Past Medical History:   Diagnosis Date     Asthma      Benign intracranial hypertension 1/14/2013     Essential hypertension 2/26/2020     Migraine without aura and without status migrainosus, not intractable 2/26/2020     Moderate persistent asthma  9/1/2007    Formatting of this note might be different from the original. Overview:  Uses albuterol TID-QID and Advair BID Formatting of this note might be different from the original. Uses albuterol TID-QID and Advair BID  Overview:  Overview:  Uses albuterol TID-QID and Advair BID     Morbid obesity (H) 4/21/2021     Myofascial pain dysfunction syndrome 8/1/2007     PPD positive 8/29/2007    Formatting of this note might be different from the original. Overview:  S/p therapy Formatting of this note might be different from the original. S/p therapy  Overview:  Overview:  S/p therapy     PVC (premature ventricular contraction) 4/21/2021    Formatting of this note might be different from the original. Low burden but symptomatic. Noted on outpatient holter monitor and in recovery phase of stress testing.       PAST SURGICAL HISTORY:  Past Surgical History:   Procedure Laterality Date     OTHER SURGICAL HISTORY      Right-sided tympanomastoidectomy           CURRENT MEDICATIONS:    No current facility-administered medications for this encounter.     Current Outpatient Medications   Medication     fluticasone (FLOVENT HFA) 44 MCG/ACT inhaler     ondansetron (ZOFRAN ODT) 4 MG ODT tab     metoclopramide (REGLAN) 10 MG tablet     UBRELVY 50 MG tablet         ALLERGIES:  Allergies   Allergen Reactions     Acetazolamide Itching     Tramadol Itching     Oxycodone Unknown     Vicodin [Hydrocodone-Acetaminophen] Unknown         VITALS:  Patient Vitals for the past 24 hrs:   BP Temp Temp src Pulse Resp SpO2 Weight   02/25/23 1214 (!) 179/110 97.5  F (36.4  C) Oral 88 20 100 % 103.4 kg (228 lb)       PHYSICAL EXAM    Constitutional:  alert, no distress  EYES: Conjunctivae clear  HENT:  Atraumatic, normocephalic  Respiratory:  No respiratory distress, normal breath sounds  Cardiovascular:  Normal rate, normal rhythm, no murmurs  Integument: Warm, Dry, No erythema, No rash.   Neurologic:  Alert & oriented x 3              LAB:  All  pertinent labs reviewed and interpreted.  Labs Ordered and Resulted from Time of ED Arrival to Time of ED Departure   INFLUENZA A/B & SARS-COV2 PCR MULTIPLEX - Normal       Result Value    Influenza A PCR Negative      Influenza B PCR Negative      RSV PCR Negative      SARS CoV2 PCR Negative           RADIOLOGY:  Reviewed all pertinent imaging. Please see official radiology report.  No orders to display             PROCEDURES:   None      ZENAIDA Berrios, CNP  Emergency Services  [facility] EMERGENCY ROOM  [facility] [facility] [id number] [phone]  Dept: [phone]         Heber Tidwell APRN CNP  02/25/23 1429       Heber Tidwell APRN CNP  02/25/23 1437

## 2023-02-25 NOTE — ED TRIAGE NOTES
Symptoms since Thursday night.  Has not taken covid test.  No OTC meds since last night   Triage Assessment     Row Name 02/25/23 1213       Triage Assessment (Adult)    Airway WDL WDL       Respiratory WDL    Respiratory WDL WDL       Skin Circulation/Temperature WDL    Skin Circulation/Temperature WDL WDL       Cardiac WDL    Cardiac WDL WDL       Peripheral/Neurovascular WDL    Peripheral Neurovascular WDL WDL       Cognitive/Neuro/Behavioral WDL    Cognitive/Neuro/Behavioral WDL WDL

## 2023-07-25 ENCOUNTER — HOSPITAL ENCOUNTER (EMERGENCY)
Facility: CLINIC | Age: 43
Discharge: HOME | End: 2023-07-25
Attending: EMERGENCY MEDICINE | Admitting: EMERGENCY MEDICINE
Payer: COMMERCIAL

## 2023-07-25 VITALS
RESPIRATION RATE: 19 BRPM | WEIGHT: 227 LBS | SYSTOLIC BLOOD PRESSURE: 156 MMHG | BODY MASS INDEX: 41.77 KG/M2 | DIASTOLIC BLOOD PRESSURE: 100 MMHG | TEMPERATURE: 98.2 F | HEIGHT: 62 IN | HEART RATE: 76 BPM | OXYGEN SATURATION: 100 %

## 2023-07-25 DIAGNOSIS — I10 HYPERTENSION, UNSPECIFIED TYPE: ICD-10-CM

## 2023-07-25 LAB
ALBUMIN UR-MCNC: NEGATIVE MG/DL
ANION GAP SERPL CALCULATED.3IONS-SCNC: 9 MMOL/L (ref 5–18)
APPEARANCE UR: CLEAR
ATRIAL RATE - MUSE: 70 BPM
BASOPHILS # BLD AUTO: 0 10E3/UL (ref 0–0.2)
BASOPHILS NFR BLD AUTO: 1 %
BILIRUB UR QL STRIP: NEGATIVE
BUN SERPL-MCNC: 8 MG/DL (ref 8–22)
CALCIUM SERPL-MCNC: 9.3 MG/DL (ref 8.5–10.5)
CHLORIDE BLD-SCNC: 105 MMOL/L (ref 98–107)
CO2 SERPL-SCNC: 23 MMOL/L (ref 22–31)
COLOR UR AUTO: COLORLESS
CREAT SERPL-MCNC: 0.67 MG/DL (ref 0.6–1.1)
DIASTOLIC BLOOD PRESSURE - MUSE: NORMAL MMHG
EOSINOPHIL # BLD AUTO: 0.1 10E3/UL (ref 0–0.7)
EOSINOPHIL NFR BLD AUTO: 1 %
ERYTHROCYTE [DISTWIDTH] IN BLOOD BY AUTOMATED COUNT: 12.7 % (ref 10–15)
GFR SERPL CREATININE-BSD FRML MDRD: >90 ML/MIN/1.73M2
GLUCOSE BLD-MCNC: 91 MG/DL (ref 70–125)
GLUCOSE UR STRIP-MCNC: NEGATIVE MG/DL
HCT VFR BLD AUTO: 37.7 % (ref 35–47)
HGB BLD-MCNC: 12.3 G/DL (ref 11.7–15.7)
HGB UR QL STRIP: NEGATIVE
IMM GRANULOCYTES # BLD: 0 10E3/UL
IMM GRANULOCYTES NFR BLD: 0 %
INTERPRETATION ECG - MUSE: NORMAL
KETONES UR STRIP-MCNC: NEGATIVE MG/DL
LEUKOCYTE ESTERASE UR QL STRIP: ABNORMAL
LYMPHOCYTES # BLD AUTO: 2.8 10E3/UL (ref 0.8–5.3)
LYMPHOCYTES NFR BLD AUTO: 44 %
MAGNESIUM SERPL-MCNC: 2 MG/DL (ref 1.8–2.6)
MCH RBC QN AUTO: 29.3 PG (ref 26.5–33)
MCHC RBC AUTO-ENTMCNC: 32.6 G/DL (ref 31.5–36.5)
MCV RBC AUTO: 90 FL (ref 78–100)
MONOCYTES # BLD AUTO: 0.4 10E3/UL (ref 0–1.3)
MONOCYTES NFR BLD AUTO: 7 %
MUCOUS THREADS #/AREA URNS LPF: PRESENT /LPF
NEUTROPHILS # BLD AUTO: 3 10E3/UL (ref 1.6–8.3)
NEUTROPHILS NFR BLD AUTO: 47 %
NITRATE UR QL: NEGATIVE
NRBC # BLD AUTO: 0 10E3/UL
NRBC BLD AUTO-RTO: 0 /100
P AXIS - MUSE: 45 DEGREES
PH UR STRIP: 7.5 [PH] (ref 5–7)
PLATELET # BLD AUTO: 301 10E3/UL (ref 150–450)
POTASSIUM BLD-SCNC: 3.8 MMOL/L (ref 3.5–5)
PR INTERVAL - MUSE: 150 MS
QRS DURATION - MUSE: 80 MS
QT - MUSE: 412 MS
QTC - MUSE: 444 MS
R AXIS - MUSE: -7 DEGREES
RBC # BLD AUTO: 4.2 10E6/UL (ref 3.8–5.2)
RBC URINE: <1 /HPF
SODIUM SERPL-SCNC: 137 MMOL/L (ref 136–145)
SP GR UR STRIP: 1 (ref 1–1.03)
SQUAMOUS EPITHELIAL: 1 /HPF
SYSTOLIC BLOOD PRESSURE - MUSE: NORMAL MMHG
T AXIS - MUSE: 26 DEGREES
UROBILINOGEN UR STRIP-MCNC: <2 MG/DL
VENTRICULAR RATE- MUSE: 70 BPM
WBC # BLD AUTO: 6.3 10E3/UL (ref 4–11)
WBC URINE: 14 /HPF

## 2023-07-25 PROCEDURE — 93005 ELECTROCARDIOGRAM TRACING: CPT | Performed by: EMERGENCY MEDICINE

## 2023-07-25 PROCEDURE — 81001 URINALYSIS AUTO W/SCOPE: CPT | Performed by: EMERGENCY MEDICINE

## 2023-07-25 PROCEDURE — 99284 EMERGENCY DEPT VISIT MOD MDM: CPT | Mod: 25

## 2023-07-25 PROCEDURE — 36415 COLL VENOUS BLD VENIPUNCTURE: CPT | Performed by: EMERGENCY MEDICINE

## 2023-07-25 PROCEDURE — 82310 ASSAY OF CALCIUM: CPT | Performed by: EMERGENCY MEDICINE

## 2023-07-25 PROCEDURE — 250N000013 HC RX MED GY IP 250 OP 250 PS 637: Performed by: EMERGENCY MEDICINE

## 2023-07-25 PROCEDURE — 85014 HEMATOCRIT: CPT | Performed by: EMERGENCY MEDICINE

## 2023-07-25 PROCEDURE — 83735 ASSAY OF MAGNESIUM: CPT | Performed by: EMERGENCY MEDICINE

## 2023-07-25 PROCEDURE — 87086 URINE CULTURE/COLONY COUNT: CPT | Performed by: EMERGENCY MEDICINE

## 2023-07-25 RX ORDER — AMLODIPINE BESYLATE 5 MG/1
5 TABLET ORAL ONCE
Status: COMPLETED | OUTPATIENT
Start: 2023-07-25 | End: 2023-07-25

## 2023-07-25 RX ORDER — AMLODIPINE BESYLATE 5 MG/1
5 TABLET ORAL DAILY
Qty: 30 TABLET | Refills: 0 | Status: SHIPPED | OUTPATIENT
Start: 2023-07-25

## 2023-07-25 RX ORDER — ACETAMINOPHEN 325 MG/1
975 TABLET ORAL ONCE
Status: COMPLETED | OUTPATIENT
Start: 2023-07-25 | End: 2023-07-25

## 2023-07-25 RX ADMIN — ACETAMINOPHEN 975 MG: 325 TABLET ORAL at 10:42

## 2023-07-25 RX ADMIN — AMLODIPINE BESYLATE 5 MG: 5 TABLET ORAL at 10:42

## 2023-07-25 ASSESSMENT — ACTIVITIES OF DAILY LIVING (ADL)
ADLS_ACUITY_SCORE: 35
ADLS_ACUITY_SCORE: 35

## 2023-07-25 NOTE — ED PROVIDER NOTES
EMERGENCY DEPARTMENT ENCOUNTER      NAME: Nancy Delgado  AGE: 42 year old female  YOB: 1980  MRN: [id number]  EVALUATION DATE & TIME: 7/25/2023  8:21 AM    PCP: Summer Joel    ED PROVIDER: Berkley Peña MD      Chief Complaint   Patient presents with    Hypertension    Dizziness         FINAL IMPRESSION:  1. Hypertension, unspecified type          ED COURSE & MEDICAL DECISION MAKING:    Pertinent Labs & Imaging studies reviewed. (See chart for details)  42 year old female presents to the Emergency Department for evaluation of hypertension, headache, associated dizziness.  She states that she went off of her amlodipine about 6 months ago as she felt it was causing her to have blood pressures that are too low.  She did not consults her primary care provider, just discontinued her medications.  Per chart review, the patient had previously been on carvedilol with her amlodipine.  She states that she had also discontinued her carvedilol as well at this time.  While in the emergency department her blood pressure did improve to 156/100.  I suggested that she resume taking amlodipine at minimum to see if this does improve her blood pressure without causing episodes of hypotension.  She is agreeable with this plan.  I would recommend follow-up with her PMD for reassessment of blood pressure management.  Laboratory studies in the emergency department are unremarkable.  On exam she has no focal neurologic deficits.  ECG with no arrhythmia or acute ischemia.  Patient is comfortable with discharge home with management of her blood pressure and follow-up with her PMD.  I do not suspect acute intracranial hemorrhage, the patient's headache is fairly mild at this time.    At the conclusion of the encounter I discussed the results of all of the tests and the disposition. The questions were answered. The patient or family acknowledged understanding and was agreeable with the care plan.        8:29 AM I met with the patient to gather history and to perform my initial exam. We discussed plans for the ED course, including diagnostic testing and treatment.   10:22 AM I rechecked and updated the patient with results.    Medical Decision Making    History:  Supplemental history from: Documented in chart, if applicable  External Record(s) reviewed: Documented in chart, if applicable.    Work Up:  Chart documentation includes differential considered and any EKGs or imaging independently interpreted by provider, where specified.  In additional to work up documented, I considered the following work up: Documented in chart, if applicable.    External consultation:  Discussion of management with another provider: Documented in chart, if applicable    Complicating factors:  Care impacted by chronic illness: Hypertension  Care affected by social determinants of health: Access to Medical Care    Disposition considerations: Discharge. I prescribed additional prescription strength medication(s) as charted. See documentation for any additional details.      MEDICATIONS GIVEN IN THE EMERGENCY:  Medications   amLODIPine (NORVASC) tablet 5 mg (5 mg Oral $Given 7/25/23 1042)   acetaminophen (TYLENOL) tablet 975 mg (975 mg Oral $Given 7/25/23 1042)       NEW PRESCRIPTIONS STARTED AT TODAY'S ER VISIT  Discharge Medication List as of 7/25/2023 10:44 AM        START taking these medications    Details   amLODIPine (NORVASC) 5 MG tablet Take 1 tablet (5 mg) by mouth daily, Disp-30 tablet, R-0, E-Prescribe                =================================================================    HPI    Patient information was obtained from: patient     Use of : N/A        Nancy Delgado is a 42 year old female with a pertinent history of hypertension, migraine, PVC, and morbid obesity who presents to this ED by private car for evaluation of high blood pressure and dizziness.    Per chart review, patient had a [facility]  "clinic visit on 7/16/2021 and was on Carvedilol 25 mg BID and Amlodipine 5 mg daily, at this time.     Patient reports she woke up this morning with a heavy-like headache on the the left side of her forehead. While at work this morning she began feeling dizzy. Patient drank some orange juice, however her symptoms of dizziness persisted. 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 Patient checked her blood pressure and found it to be 204/144. Upon evaluation in the ED today, patient continues to endorse intermittent dizziness and also rates her headache as a +8/10 in severity. She also reports feeling \"weird,\" with symptoms of nausea, tingling, and feeling cold. Of note, patient was previously on Amlodipine, but decided to discontinue its use 6 months ago due to issues with low blood pressure while on the medication. She states she took Amlodipine for about 1 year.     Patient denies chest pain, shortness of breath, abdominal pain, or changes in vision. No other medical concerns are expressed at this time.       PAST MEDICAL HISTORY:  Past Medical History:   Diagnosis Date    Asthma     Benign intracranial hypertension 1/14/2013    Essential hypertension 2/26/2020    Migraine without aura and without status migrainosus, not intractable 2/26/2020    Moderate persistent asthma 9/1/2007    Formatting of this note might be different from the original. Overview:  Uses albuterol TID-QID and Advair BID Formatting of this note might be different from the original. Uses albuterol TID-QID and Advair BID  Overview:  Overview:  Uses albuterol TID-QID and Advair BID    Morbid obesity (H) 4/21/2021    Myofascial pain dysfunction syndrome 8/1/2007    PPD positive 8/29/2007    Formatting of this note might be different from the original. Overview:  S/p therapy Formatting of this note might be different from the original. S/p therapy  Overview:  Overview:  S/p therapy    PVC (premature ventricular " "contraction) 4/21/2021    Formatting of this note might be different from the original. Low burden but symptomatic. Noted on outpatient holter monitor and in recovery phase of stress testing.       PAST SURGICAL HISTORY:  Past Surgical History:   Procedure Laterality Date    OTHER SURGICAL HISTORY      Right-sided tympanomastoidectomy           CURRENT MEDICATIONS:    amLODIPine (NORVASC) 5 MG tablet  fluticasone (FLOVENT HFA) 44 MCG/ACT inhaler  metoclopramide (REGLAN) 10 MG tablet  UBRELVY 50 MG tablet        ALLERGIES:  Allergies   Allergen Reactions    Acetazolamide Itching    Tramadol Itching    Oxycodone Unknown    Vicodin [Hydrocodone-Acetaminophen] Unknown       FAMILY HISTORY:  History reviewed. No pertinent family history.    SOCIAL HISTORY:   Social History     Socioeconomic History    Marital status:    Tobacco Use    Smoking status: Never   Substance and Sexual Activity    Alcohol use: No    Drug use: No       VITALS:  BP (!) 156/100   Pulse 76   Temp 98.2  F (36.8  C) (Oral)   Resp 19   Ht 1.575 m (5' 2\")   Wt 103 kg (227 lb)   LMP 07/17/2023   SpO2 100%   BMI 41.52 kg/m      PHYSICAL EXAM    Constitutional: Well developed, Well nourished, NAD  HENT: Normocephalic, Atraumatic, Bilateral external ears normal, Oropharynx normal, mucous membranes moist, Nose normal.   Neck- Normal range of motion, No tenderness, Supple, No stridor.  Eyes: PERRL, EOMI, Conjunctiva normal, No discharge.   Respiratory: Normal breath sounds, No respiratory distress  Cardiovascular: Normal heart rate, Regular rhythm  GI: Bowel sounds normal, Soft, No tenderness,   Musculoskeletal: No edema. Good range of motion in all major joints. No tenderness to palpation or major deformities noted.   Integument: Warm, Dry, No erythema, No rash  Neurologic: Alert & oriented x 3, Normal motor function, Normal sensory function, No focal deficits noted. Normal gait.   Psychiatric: Affect normal, Judgment normal, Mood normal.    "   LAB:  All pertinent labs reviewed and interpreted.  Results for orders placed or performed during the hospital encounter of 07/25/23   Basic metabolic panel   Result Value Ref Range    Sodium 137 136 - 145 mmol/L    Potassium 3.8 3.5 - 5.0 mmol/L    Chloride 105 98 - 107 mmol/L    Carbon Dioxide (CO2) 23 22 - 31 mmol/L    Anion Gap 9 5 - 18 mmol/L    Urea Nitrogen 8 8 - 22 mg/dL    Creatinine 0.67 0.60 - 1.10 mg/dL    Calcium 9.3 8.5 - 10.5 mg/dL    Glucose 91 70 - 125 mg/dL    GFR Estimate >90 >60 mL/min/1.73m2   Result Value Ref Range    Magnesium 2.0 1.8 - 2.6 mg/dL   UA with Microscopic reflex to Culture    Specimen: Urine, Midstream   Result Value Ref Range    Color Urine Colorless Colorless, Straw, Light Yellow, Yellow    Appearance Urine Clear Clear    Glucose Urine Negative Negative mg/dL    Bilirubin Urine Negative Negative    Ketones Urine Negative Negative mg/dL    Specific Gravity Urine 1.005 1.001 - 1.030    Blood Urine Negative Negative    pH Urine 7.5 (H) 5.0 - 7.0    Protein Albumin Urine Negative Negative mg/dL    Urobilinogen Urine <2.0 <2.0 mg/dL    Nitrite Urine Negative Negative    Leukocyte Esterase Urine 75 Gregg/uL (A) Negative    Mucus Urine Present (A) None Seen /LPF    RBC Urine <1 <=2 /HPF    WBC Urine 14 (H) <=5 /HPF    Squamous Epithelials Urine 1 <=1 /HPF   CBC with platelets and differential   Result Value Ref Range    WBC Count 6.3 4.0 - 11.0 10e3/uL    RBC Count 4.20 3.80 - 5.20 10e6/uL    Hemoglobin 12.3 11.7 - 15.7 g/dL    Hematocrit 37.7 35.0 - 47.0 %    MCV 90 78 - 100 fL    MCH 29.3 26.5 - 33.0 pg    MCHC 32.6 31.5 - 36.5 g/dL    RDW 12.7 10.0 - 15.0 %    Platelet Count 301 150 - 450 10e3/uL    % Neutrophils 47 %    % Lymphocytes 44 %    % Monocytes 7 %    % Eosinophils 1 %    % Basophils 1 %    % Immature Granulocytes 0 %    NRBCs per 100 WBC 0 <1 /100    Absolute Neutrophils 3.0 1.6 - 8.3 10e3/uL    Absolute Lymphocytes 2.8 0.8 - 5.3 10e3/uL    Absolute Monocytes 0.4 0.0 -  1.3 10e3/uL    Absolute Eosinophils 0.1 0.0 - 0.7 10e3/uL    Absolute Basophils 0.0 0.0 - 0.2 10e3/uL    Absolute Immature Granulocytes 0.0 <=0.4 10e3/uL    Absolute NRBCs 0.0 10e3/uL       RADIOLOGY:  Reviewed all pertinent imaging. Please see official radiology report.  No orders to display       EKG:    Performed at: 8:38 AM    Rate: 70 BPM  Rhythm: Simus rhythm     MT Interval: 150 ms  QRS Interval: 80 ms  QTc Interval: 444 ms  ST Changes: PVC's no longer present. Comparison: 08-JAN-2023    I have independently reviewed and interpreted the EKG(s) documented above.      I, Avis Alvarado, am serving as a scribe to document services personally performed by Berkley Peña, based on my observation and the provider's statements to me. I, Berkley Peña MD, attest that Avis Alvarado is acting in a scribe capacity, has observed my performance of the services and has documented them in accordance with my direction.    Berkley Peña MD  Emergency Medicine  [facility] EMERGENCY ROOM  [address] [id number] [phone]       Berkley Peña MD  07/25/23 3027

## 2023-07-25 NOTE — ED TRIAGE NOTES
Pt reports her blood pressure has been high and low the last few days.  Pt states her blood pressure was 204/144 this morning.  She checked it due to feeling dizzy.  Pt took a baby aspirin. Pt reports a headache.  Pt denies shortness of breath.

## 2023-07-25 NOTE — Clinical Note
Nancy Delgado was seen and treated in our emergency department on 7/25/2023.  She may return to work on 07/27/2023.       If you have any questions or concerns, please don't hesitate to call.      Berkley Peña MD

## 2023-07-26 LAB — BACTERIA UR CULT: NORMAL

## 2024-01-01 ENCOUNTER — HOSPITAL ENCOUNTER (EMERGENCY)
Facility: CLINIC | Age: 44
Discharge: HOME | End: 2024-01-01
Attending: EMERGENCY MEDICINE

## 2024-01-01 VITALS
RESPIRATION RATE: 15 BRPM | SYSTOLIC BLOOD PRESSURE: 125 MMHG | OXYGEN SATURATION: 98 % | BODY MASS INDEX: 42.07 KG/M2 | TEMPERATURE: 97.8 F | HEART RATE: 94 BPM | WEIGHT: 230 LBS | DIASTOLIC BLOOD PRESSURE: 79 MMHG

## 2024-01-01 DIAGNOSIS — N76.2 CELLULITIS OF LABIA MAJORA: ICD-10-CM

## 2024-01-01 DIAGNOSIS — L73.9 FOLLICULITIS: ICD-10-CM

## 2024-01-01 LAB
ALBUMIN UR-MCNC: 20 MG/DL
APPEARANCE UR: CLEAR
BILIRUB UR QL STRIP: NEGATIVE
COLOR UR AUTO: ABNORMAL
GLUCOSE UR STRIP-MCNC: NEGATIVE MG/DL
HGB UR QL STRIP: NEGATIVE
KETONES UR STRIP-MCNC: NEGATIVE MG/DL
LEUKOCYTE ESTERASE UR QL STRIP: NEGATIVE
MUCOUS THREADS #/AREA URNS LPF: PRESENT /LPF
NITRATE UR QL: NEGATIVE
PH UR STRIP: 7 [PH] (ref 5–7)
RBC URINE: 4 /HPF
SP GR UR STRIP: 1.03 (ref 1–1.03)
SQUAMOUS EPITHELIAL: 2 /HPF
UROBILINOGEN UR STRIP-MCNC: <2 MG/DL
WBC URINE: 1 /HPF

## 2024-01-01 PROCEDURE — 99284 EMERGENCY DEPT VISIT MOD MDM: CPT

## 2024-01-01 PROCEDURE — 96372 THER/PROPH/DIAG INJ SC/IM: CPT | Performed by: EMERGENCY MEDICINE

## 2024-01-01 PROCEDURE — 250N000009 HC RX 250: Performed by: EMERGENCY MEDICINE

## 2024-01-01 PROCEDURE — 250N000013 HC RX MED GY IP 250 OP 250 PS 637: Performed by: EMERGENCY MEDICINE

## 2024-01-01 PROCEDURE — 250N000011 HC RX IP 250 OP 636: Performed by: EMERGENCY MEDICINE

## 2024-01-01 PROCEDURE — 81001 URINALYSIS AUTO W/SCOPE: CPT | Performed by: EMERGENCY MEDICINE

## 2024-01-01 RX ORDER — CEPHALEXIN 500 MG/1
500 CAPSULE ORAL 4 TIMES DAILY
Qty: 28 CAPSULE | Refills: 0 | Status: SHIPPED | OUTPATIENT
Start: 2024-01-01 | End: 2024-01-08

## 2024-01-01 RX ORDER — MORPHINE SULFATE 4 MG/ML
4 INJECTION, SOLUTION INTRAMUSCULAR; INTRAVENOUS
Status: COMPLETED | OUTPATIENT
Start: 2024-01-01 | End: 2024-01-01

## 2024-01-01 RX ORDER — LIDOCAINE HYDROCHLORIDE 20 MG/ML
10 JELLY TOPICAL ONCE
Status: COMPLETED | OUTPATIENT
Start: 2024-01-01 | End: 2024-01-01

## 2024-01-01 RX ORDER — LIDOCAINE 40 MG/G
CREAM TOPICAL EVERY 6 HOURS PRN
Qty: 15 G | Refills: 0 | Status: SHIPPED | OUTPATIENT
Start: 2024-01-01

## 2024-01-01 RX ORDER — ONDANSETRON 4 MG/1
4 TABLET, ORALLY DISINTEGRATING ORAL ONCE
Status: COMPLETED | OUTPATIENT
Start: 2024-01-01 | End: 2024-01-01

## 2024-01-01 RX ORDER — CEPHALEXIN 500 MG/1
500 CAPSULE ORAL ONCE
Status: COMPLETED | OUTPATIENT
Start: 2024-01-01 | End: 2024-01-01

## 2024-01-01 RX ORDER — KETOROLAC TROMETHAMINE 10 MG/1
10 TABLET, FILM COATED ORAL EVERY 6 HOURS PRN
Qty: 20 TABLET | Refills: 0 | Status: SHIPPED | OUTPATIENT
Start: 2024-01-01

## 2024-01-01 RX ADMIN — ONDANSETRON 4 MG: 4 TABLET, ORALLY DISINTEGRATING ORAL at 22:20

## 2024-01-01 RX ADMIN — LIDOCAINE HYDROCHLORIDE 10 ML: 20 JELLY TOPICAL at 22:24

## 2024-01-01 RX ADMIN — MORPHINE SULFATE 4 MG: 4 INJECTION, SOLUTION INTRAMUSCULAR; INTRAVENOUS at 22:22

## 2024-01-01 RX ADMIN — CEPHALEXIN 500 MG: 500 CAPSULE ORAL at 22:22

## 2024-01-01 ASSESSMENT — ACTIVITIES OF DAILY LIVING (ADL): ADLS_ACUITY_SCORE: 35

## 2024-01-01 NOTE — Clinical Note
Nancy Delgado was seen and treated in our emergency department on 1/1/2024.  She may return to work on 01/05/2024.       If you have any questions or concerns, please don't hesitate to call.      Raul Moss MD

## 2024-01-02 ENCOUNTER — ANCILLARY PROCEDURE (OUTPATIENT)
Dept: ULTRASOUND IMAGING | Facility: CLINIC | Age: 44
End: 2024-01-02
Attending: EMERGENCY MEDICINE

## 2024-01-02 NOTE — ED TRIAGE NOTES
Pt co vaginal abscess for about 1 week, getting larger, hot to touch and very painful, denies discharge.   denies urinary Sx.

## 2024-01-02 NOTE — ED NOTES
"Took patient to the bathroom. Patient moaning in pain. Rates pain 12/10 and says \"this pain is worse than labor\".   "

## 2024-01-02 NOTE — ED PROVIDER NOTES
NAME: Nancy Delgado  AGE: 43 year old female  YOB: 1980  MRN: [id number]  EVALUATION DATE & TIME: 2024  9:20 PM    PCP: Summer Joel    ED PROVIDER: Raul Moss M.D.    Chief Complaint   Patient presents with    Vaginal Pain     FINAL IMPRESSION:  1. Cellulitis of labia majora    2. Folliculitis      MEDICAL DECISION MAKIN:58 PM I met with the patient, obtained history, performed an initial exam, and discussed options and plan for diagnostics and treatment here in the ED.   10:39 PM I rechecked and updated patient on results. Discussed plan for bedside ultrasound.  11:22 PM Performed bedside ultrasound. We discussed the plan for discharge and the patient is agreeable. Reviewed supportive cares, symptomatic treatment, outpatient follow up, and reasons to return to the Emergency Department. Patient to be discharged by ED RN.     Patient was clinically assessed and consented to treatment. After assessment, medical decision making and workup were discussed with the patient. The patient was agreeable to plan for testing, workup, and treatment.  Pertinent Labs & Imaging studies reviewed. (See chart for details)       Medical Decision Making    History:  Supplemental history from: Documented in chart, if applicable  External Record(s) reviewed: Documented in chart, if applicable.    Work Up:  Chart documentation includes differential considered and any EKGs or imaging independently interpreted by provider, where specified.  In additional to work up documented, I considered the following work up: Documented in chart, if applicable.    External consultation:  Discussion of management with another provider: Documented in chart, if applicable    Complicating factors:  Care impacted by chronic illness: Hypertension  Care affected by social determinants of health: N/A    Disposition considerations: Discharge. I prescribed additional prescription strength medication(s) as  charted. See documentation for any additional details.    Nancy Delgado is a 43 year old female who presents with vaginal pain.   Differential diagnosis includes but not limited to Bartholin cyst, labial abscess, labial cellulitis, folliculitis, herpes simplex.  Patient is a 43-year-old female with 1 week of increasing swelling and pain on the left labia.  She did have a small swelling on the posterior of the right labia but that has gone down and is drying up now.  The 1 on the right is almost gone and not particularly tender.  On the left there is a swelling with small indurated follicle on the more anterior portion with surrounding erythema.  Unlikely Bartholin cyst given the location as well as the external nature of it in the labial edge.  Follicle does appear to have almost come to ahead and I do not feel a fluctuant pocket.  After discussion with patient given dose of pain medication and some lidocaine cream over it along with antibiotics will be started.  I did start Keflex as I feel this is more likely skin related rather than vaginal raheem.  It does appear to come from one of the hair follicles external on the labia.  Ultrasound was performed and I did not see any large fluid pocket but rather a hard and dense tissue with induration and some cobblestoning consistent with likely cellulitis around there.  Given that there is no fluid collection for drainage I do not feel I&D would be appropriate at this time given the sensitivity of the area and need for not only injection but incision.  With the ultrasound mainly showing the cobblestoning feel this is likely cellulitis that started from the follicle and patient will be plan for treat with antibiotics, lidocaine cream to help with localized tenderness and sensitivity as well as pain medication to go home on.  We discussed plan for care with close follow-up for recheck of area as cellulitis could become more severe.  She is to return if the lesion gets  more swollen, more painful, or any new concerns.    0 minutes of critical care time    MEDICATIONS GIVEN IN THE EMERGENCY:  Medications   ondansetron (ZOFRAN ODT) ODT tab 4 mg (4 mg Oral $Given 1/1/24 2220)   morphine (PF) injection 4 mg (4 mg Intramuscular $Given 1/1/24 2222)   lidocaine (XYLOCAINE) 2 % external gel 10 mL (10 mLs Topical $Given 1/1/24 2224)   cephALEXin (KEFLEX) capsule 500 mg (500 mg Oral $Given 1/1/24 2222)       NEW PRESCRIPTIONS STARTED AT TODAY'S ER VISIT:  Discharge Medication List as of 1/1/2024 11:43 PM        START taking these medications    Details   cephALEXin (KEFLEX) 500 MG capsule Take 1 capsule (500 mg) by mouth 4 times daily for 7 days, Disp-28 capsule, R-0, Local Print      ketorolac (TORADOL) 10 MG tablet Take 1 tablet (10 mg) by mouth every 6 hours as needed for moderate pain, Disp-20 tablet, R-0, Local Print      lidocaine (LMX4) 4 % external cream Apply topically every 6 hours as needed for mild pain or other (labial skin pain.)Disp-15 g, R-0Local Print                =================================================================    HPI    Patient information was obtained from: Patient    Use of : N/A         Nancy Delgado is a 43 year old female with a past medical history of HTN and asthma, who presents to the ED via walk-I for the evaluation of vaginal pain.    Patient reports vaginal abscess that she noticed about a week ago on her labia. She states she has another smaller one on the other side, but states it ruptured on its own and has gone away. Patient reports abscess has gotten and notes associating persisting vaginal pain secondary to abscess. Otherwise, she denies any drainage or discharge. No other complaints at this time.    REVIEW OF SYSTEMS   Review of Systems   Genitourinary:  Positive for vaginal pain. Negative for vaginal discharge.        Positive for abscess on labia   All other systems reviewed and are negative.       PAST MEDICAL  HISTORY:  Past Medical History:   Diagnosis Date    Asthma     Benign intracranial hypertension 1/14/2013    Essential hypertension 2/26/2020    Migraine without aura and without status migrainosus, not intractable 2/26/2020    Moderate persistent asthma 9/1/2007    Formatting of this note might be different from the original. Overview:  Uses albuterol TID-QID and Advair BID Formatting of this note might be different from the original. Uses albuterol TID-QID and Advair BID  Overview:  Overview:  Uses albuterol TID-QID and Advair BID    Morbid obesity (H) 4/21/2021    Myofascial pain dysfunction syndrome 8/1/2007    PPD positive 8/29/2007    Formatting of this note might be different from the original. Overview:  S/p therapy Formatting of this note might be different from the original. S/p therapy  Overview:  Overview:  S/p therapy    PVC (premature ventricular contraction) 4/21/2021    Formatting of this note might be different from the original. Low burden but symptomatic. Noted on outpatient holter monitor and in recovery phase of stress testing.       PAST SURGICAL HISTORY:  Past Surgical History:   Procedure Laterality Date    OTHER SURGICAL HISTORY      Right-sided tympanomastoidectomy       CURRENT MEDICATIONS:    No current facility-administered medications for this encounter.    Current Outpatient Medications:     cephALEXin (KEFLEX) 500 MG capsule, Take 1 capsule (500 mg) by mouth 4 times daily for 7 days, Disp: 28 capsule, Rfl: 0    ketorolac (TORADOL) 10 MG tablet, Take 1 tablet (10 mg) by mouth every 6 hours as needed for moderate pain, Disp: 20 tablet, Rfl: 0    lidocaine (LMX4) 4 % external cream, Apply topically every 6 hours as needed for mild pain or other (labial skin pain.), Disp: 15 g, Rfl: 0    amLODIPine (NORVASC) 5 MG tablet, Take 1 tablet (5 mg) by mouth daily, Disp: 30 tablet, Rfl: 0    fluticasone (FLOVENT HFA) 44 MCG/ACT inhaler, Inhale 2 puffs into the lungs, Disp: , Rfl:      metoclopramide (REGLAN) 10 MG tablet, Take 1 tablet (10 mg) by mouth 3 times daily as needed (headache and nausea), Disp: 30 tablet, Rfl: 0    UBRELVY 50 MG tablet, TAKE 1 TABLET BY MOUTH AT ONSET OF HEADACHE. MAY REPEAT 1 DOSE IN 2 HOURS AS NEEDED. MAX DOSE 100 MG / 24 HOURS, Disp: , Rfl:     ALLERGIES:  Allergies   Allergen Reactions    Acetazolamide Itching    Tramadol Itching    Oxycodone Unknown    Vicodin [Hydrocodone-Acetaminophen] Unknown       FAMILY HISTORY:  History reviewed. No pertinent family history.    SOCIAL HISTORY:   Social History     Socioeconomic History    Marital status:    Tobacco Use    Smoking status: Never   Substance and Sexual Activity    Alcohol use: No    Drug use: No       PHYSICAL EXAM:    Vitals: /79   Pulse 94   Temp 97.8  F (36.6  C) (Temporal)   Resp 15   Wt 104.3 kg (230 lb)   LMP 12/15/2023   SpO2 98%   BMI 42.07 kg/m     Physical Exam  Vitals and nursing note reviewed. Exam conducted with a chaperone present.   Constitutional:       General: She is not in acute distress.     Appearance: Normal appearance.   HENT:      Head: Normocephalic.   Pulmonary:      Effort: No respiratory distress.   Abdominal:      Tenderness: There is no abdominal tenderness.   Genitourinary:     Exam position: Supine.      Labia:         Right: Lesion present.         Left: Tenderness and lesion present.        Skin:     General: Skin is warm and dry.      Coloration: Skin is not pale.      Findings: Lesion present. No erythema.   Neurological:      General: No focal deficit present.      Mental Status: She is alert.   Psychiatric:         Behavior: Behavior normal.          LAB:  All pertinent labs reviewed and interpreted.  Labs Ordered and Resulted from Time of ED Arrival to Time of ED Departure   ROUTINE UA WITH MICROSCOPIC REFLEX TO CULTURE - Abnormal       Result Value    Color Urine Light Yellow      Appearance Urine Clear      Glucose Urine Negative      Bilirubin Urine  Negative      Ketones Urine Negative      Specific Gravity Urine 1.028      Blood Urine Negative      pH Urine 7.0      Protein Albumin Urine 20 (*)     Urobilinogen Urine <2.0      Nitrite Urine Negative      Leukocyte Esterase Urine Negative      Mucus Urine Present (*)     RBC Urine 4 (*)     WBC Urine 1      Squamous Epithelials Urine 2 (*)        RADIOLOGY:  POC US SOFT TISSUE   ED Interpretation   PROCEDURE: Emergency Department Limited Bedside Screening Ultrasound  ANATOMICAL WINDOW: Linear probe longitudinal  INDICATIONS: Swelling and evaluation for abscess  PROCEDURE PROVIDER:   Yeyo Moss  FINDINGS: Swelling at left labia with cobblestoning and edema but no obvious fluid collection.  Possible hardened right at superficial surface of peak of follicle.  IMAGES SAVED AND STORED FOR ARCHIVE AND REVIEW: No                   PROCEDURES:   Procedures         I, Jenny Mendez, am serving as a scribe to document services personally performed by Dr. Raul Moss  based on my observation and the provider's statements to me. I, Raul Moss MD attest that Jenny Mendez is acting in a scribe capacity, has observed my performance of the services and has documented them in accordance with my direction.      Raul Moss M.D.  Emergency Medicine  [facility] Emergency Department       Raul Moss MD  01/02/24 0421

## 2024-04-01 NOTE — Clinical Note
----- Message from Pat Bautista sent at 3/29/2024  1:17 PM EDT -----  Subject: Appointment Request    Reason for Call: Established Patient Appointment needed: Routine Existing   Condition Follow Up    QUESTIONS    Reason for appointment request? Other - [facility] cannot schedule     Additional Information for Provider? Patient had to reschedule her   appointment for stomach cramps. Patient states she is feeling better but   would like a follow up appointment for that as soon as possible. Patient   would like a morning appointment. Thanks.  ---------------------------------------------------------------------------  --------------  CALL BACK INFO  [phone]; OK to leave message on voicemail,OK to respond with electronic   message via [facility] portal (only for patients who have registered [facility]   account)  ---------------------------------------------------------------------------  --------------  SCRIPT ANSWERS   Nancy Delgado was seen and treated in our emergency department on 2/25/2023.  She may return to work on 02/28/2023.       If you have any questions or concerns, please don't hesitate to call.      Heber Tidwell, APRN CNP

## 2024-08-06 ENCOUNTER — APPOINTMENT (OUTPATIENT)
Dept: RADIOLOGY | Facility: CLINIC | Age: 44
End: 2024-08-06
Attending: STUDENT IN AN ORGANIZED HEALTH CARE EDUCATION/TRAINING PROGRAM

## 2024-08-06 ENCOUNTER — HOSPITAL ENCOUNTER (EMERGENCY)
Facility: CLINIC | Age: 44
Discharge: HOME | End: 2024-08-06
Attending: STUDENT IN AN ORGANIZED HEALTH CARE EDUCATION/TRAINING PROGRAM | Admitting: STUDENT IN AN ORGANIZED HEALTH CARE EDUCATION/TRAINING PROGRAM

## 2024-08-06 VITALS
DIASTOLIC BLOOD PRESSURE: 92 MMHG | WEIGHT: 215 LBS | HEIGHT: 62 IN | BODY MASS INDEX: 39.56 KG/M2 | RESPIRATION RATE: 24 BRPM | TEMPERATURE: 98 F | OXYGEN SATURATION: 98 % | HEART RATE: 73 BPM | SYSTOLIC BLOOD PRESSURE: 162 MMHG

## 2024-08-06 DIAGNOSIS — U07.1 COVID-19: ICD-10-CM

## 2024-08-06 LAB
FLUAV RNA SPEC QL NAA+PROBE: NEGATIVE
FLUBV RNA RESP QL NAA+PROBE: NEGATIVE
GROUP A STREP BY PCR: NOT DETECTED
RSV RNA SPEC NAA+PROBE: NEGATIVE
SARS-COV-2 RNA RESP QL NAA+PROBE: POSITIVE

## 2024-08-06 PROCEDURE — 87651 STREP A DNA AMP PROBE: CPT | Performed by: STUDENT IN AN ORGANIZED HEALTH CARE EDUCATION/TRAINING PROGRAM

## 2024-08-06 PROCEDURE — 250N000013 HC RX MED GY IP 250 OP 250 PS 637: Performed by: STUDENT IN AN ORGANIZED HEALTH CARE EDUCATION/TRAINING PROGRAM

## 2024-08-06 PROCEDURE — 71046 X-RAY EXAM CHEST 2 VIEWS: CPT

## 2024-08-06 PROCEDURE — 94640 AIRWAY INHALATION TREATMENT: CPT

## 2024-08-06 PROCEDURE — 87637 SARSCOV2&INF A&B&RSV AMP PRB: CPT | Performed by: STUDENT IN AN ORGANIZED HEALTH CARE EDUCATION/TRAINING PROGRAM

## 2024-08-06 PROCEDURE — 99284 EMERGENCY DEPT VISIT MOD MDM: CPT | Mod: 25

## 2024-08-06 RX ORDER — ALBUTEROL SULFATE 90 UG/1
2 AEROSOL, METERED RESPIRATORY (INHALATION) ONCE
Status: COMPLETED | OUTPATIENT
Start: 2024-08-06 | End: 2024-08-06

## 2024-08-06 RX ORDER — ONDANSETRON 4 MG/1
4 TABLET, ORALLY DISINTEGRATING ORAL EVERY 8 HOURS PRN
Qty: 10 TABLET | Refills: 0 | Status: SHIPPED | OUTPATIENT
Start: 2024-08-06 | End: 2024-08-09

## 2024-08-06 RX ADMIN — ALBUTEROL SULFATE 2 PUFF: 90 AEROSOL, METERED RESPIRATORY (INHALATION) at 03:56

## 2024-08-06 ASSESSMENT — COLUMBIA-SUICIDE SEVERITY RATING SCALE - C-SSRS
1. IN THE PAST MONTH, HAVE YOU WISHED YOU WERE DEAD OR WISHED YOU COULD GO TO SLEEP AND NOT WAKE UP?: NO
6. HAVE YOU EVER DONE ANYTHING, STARTED TO DO ANYTHING, OR PREPARED TO DO ANYTHING TO END YOUR LIFE?: NO
2. HAVE YOU ACTUALLY HAD ANY THOUGHTS OF KILLING YOURSELF IN THE PAST MONTH?: NO

## 2024-08-06 NOTE — Clinical Note
Nancy Delgado was seen and treated in our emergency department on 8/6/2024.  She may return to work on 08/12/2024.       If you have any questions or concerns, please don't hesitate to call.      Ohl, Mark Roach, DO

## 2024-08-06 NOTE — ED PROVIDER NOTES
Emergency Department Encounter         FINAL IMPRESSION:  COVID          ED COURSE AND MEDICAL DECISION MAKING   3:20 AM I met with the patient to obtain patient history and performed a physical exam. Discussed plan for ED work up including potential diagnostic studies and interventions.    ED Course as of 08/12/24 6947   Tue Aug 06, 2024   0326 Patient is an obese 43-year-old history of asthma, history of uncontrolled hypertension, here with cough, body aches, chills congestion and sore throat for the last 4 to 5 days.  1 episode of vomiting with no persistent nausea or emesis.  No abdominal pain, diarrhea, dysuria.  No rash.  Endorses a sore throat.  On arrival she looks well.  Ambulatory.  Intermittently coughing.  Lungs are clear bilaterally.  States has a history of asthma however does not have any albuterol.  TMs clear bilaterally.  No meningismus.  Throat is generally erythematous with no signs of PTA or RPA.  Full range of motion of the neck.  No stridor or drooling or trismus.   0353 Chest x-ray was independently reviewed by myself with no obvious pneumothorax or infiltrate.  Awaiting formal radiology read     -COVID-positive.  Chest x-ray clear.  Discharged home in stable condition with stable vitals.                     Medical Decision Making  Obtained supplemental history:Supplemental history obtained?: Family Member/Significant Other  Reviewed external records: External records reviewed?: No  Care impacted by chronic illness:N/A  Care significantly affected by social determinants of health:N/A  Did you consider but not order tests?: Work up considered but not performed and documented in chart, if applicable  Did you interpret images independently?: Independent interpretation of ECG and images noted in documentation, when applicable.  Consultation discussion with other provider:Did you involve another provider (consultant, , pharmacy, etc.)?: No  Discharge. No recommendations on prescription  strength medication(s). See documentation for any additional details.              EKG        Critical Care     Performed by: Mark Stephenson or    Authorized by: Mark Stephenson  Total critical care time:  minutes  Critical care was necessary to treat or prevent imminent or life-threatening deterioration of the following conditions:   Critical care was time spent personally by me on the following activities: development of treatment plan with patient or surrogate, discussions with consultants, examination of patient, evaluation of patient's response to treatment, obtaining history from patient or surrogate, ordering and performing treatments and interventions, ordering and review of laboratory studies, ordering and review of radiographic studies, re-evaluation of patient's condition and monitoring for potential decompensation.  Critical care time was exclusive of separately billable procedures and treating other patients.'    At the conclusion of the encounter I discussed the results of all the tests and the disposition. The questions were answered. The patient or family acknowledged understanding and was agreeable with the care plan.        MEDICATIONS GIVEN IN THE EMERGENCY DEPARTMENT:  Medications   albuterol (PROVENTIL HFA/VENTOLIN HFA) inhaler (has no administration in time range)       NEW PRESCRIPTIONS STARTED AT TODAY'S ED VISIT:  New Prescriptions    No medications on file       HPI     Patient information obtained from: patient and     Use of : N/A     Nancy Delgado is a 43 year old female with a pertinent history of hypertension, asthma, who presents to this ED by personal vehicle for evaluation of multiple symptoms.     The patient presents to the ED with a cough, chills, sore throat, and abdominal pain starting Thursday (8/1/24). She had one episode of vomiting. She states that she is experiencing shortness of breath and that she has asthma. She notes that her inhaler is empty. She states that  "she had a fever yesterday (8/4/24) but it has since resolved. She notes that her throat pain is at an 8/10.     Patient denies any diarrhea, rash, chest pain, and ear pain. Patient states no other complaints or concerns at this time.           MEDICAL HISTORY     Past Medical History:   Diagnosis Date    Asthma     Benign intracranial hypertension 1/14/2013    Essential hypertension 2/26/2020    Migraine without aura and without status migrainosus, not intractable 2/26/2020    Moderate persistent asthma 9/1/2007    Morbid obesity (H) 4/21/2021    Myofascial pain dysfunction syndrome 8/1/2007    PPD positive 8/29/2007    PVC (premature ventricular contraction) 4/21/2021       Past Surgical History:   Procedure Laterality Date    OTHER SURGICAL HISTORY      Right-sided tympanomastoidectomy       Social History     Tobacco Use    Smoking status: Never   Substance Use Topics    Alcohol use: No    Drug use: No       amLODIPine (NORVASC) 5 MG tablet  fluticasone (FLOVENT HFA) 44 MCG/ACT inhaler  ketorolac (TORADOL) 10 MG tablet  lidocaine (LMX4) 4 % external cream  metoclopramide (REGLAN) 10 MG tablet  UBRELVY 50 MG tablet            PHYSICAL EXAM     BP (!) 192/97   Pulse 77   Temp 98  F (36.7  C) (Oral)   Resp 28   Ht 1.575 m (5' 2\")   Wt 97.5 kg (215 lb)   SpO2 100%   BMI 39.32 kg/m        PHYSICAL EXAM:     General: Patient appears well, nontoxic, comfortable. On arrival she looks well.  Ambulatory.  Intermittently coughing.   HEENT: Moist mucous membranes,  No head trauma.  TMs are clear bilaterally. No meningismus.  Throat is generally erythematous with no signs of PTA or RPA.  Full range of motion of the neck.  No stridor or drooling or trismus.  Cardiovascular: Normal rate, normal rhythm, no extremity edema.  No appreciable murmur.  Respiratory: No signs of respiratory distress, lungs are clear to auscultation bilaterally with no wheezes rhonchi or rales.  Abdominal: Soft, nontender, nondistended, no " palpable masses, no guarding, no rebound  Musculoskeletal: Full range of motion of joints, no deformities appreciated.  Neurological: Alert and oriented, grossly neurologically intact.  Psychological: Normal affect and mood.  Integument: No rashes appreciated          RESULTS       Labs Ordered and Resulted from Time of ED Arrival to Time of ED Departure - No data to display    Chest XR,  PA & LAT    (Results Pending)         PROCEDURES:  Procedures:  Procedures       I, Bari Dowling am serving as a scribe to document services personally performed by Mark Stephenson DO, based on my observations and the provider's statements to me.  I, Mark Stephenson DO, attest that Bari Dowling is acting in a scribe capacity, has observed my performance of the services and has documented them in accordance with my direction.    Mark Stephenson DO  Emergency Medicine  [facility] EMERGENCY ROOM       Mark Stephenson DO  08/12/24 4247

## 2024-08-06 NOTE — ED TRIAGE NOTES
Patient presents to the ED complaining of a rapid heart rate, chills, headache, and cough following being exposed at work to COVID by multiple people.  History of high blood pressure but stopped taking her blood pressure medication a year ago.  Denies chest pain and shortness of breath.     Triage Assessment (Adult)       Row Name 08/06/24 0300          Triage Assessment    Airway WDL WDL        Respiratory WDL    Respiratory WDL X;cough     Cough Frequency frequent     Cough Type dry;fair        Skin Circulation/Temperature WDL    Skin Circulation/Temperature WDL WDL        Cardiac WDL    Cardiac WDL WDL        Peripheral/Neurovascular WDL    Peripheral Neurovascular WDL WDL        Cognitive/Neuro/Behavioral WDL    Cognitive/Neuro/Behavioral WDL WDL

## 2024-08-06 NOTE — DISCHARGE INSTRUCTIONS
Your COVID test was positive.  You will need to isolate yourself for at least 5 days and to start feeling better.    Take the albuterol as needed/use the albuterol as needed.    Return for any worsening symptoms including chest pain, shortness of breath, difficulty tolerating liquids or any other concerning symptoms

## 2024-08-06 NOTE — Clinical Note
accompanied Nancy Delgado to the emergency department on 8/6/2024. They may return to work on 08/09/2024.      If you have any questions or concerns, please don't hesitate to call.      Ohl, Mark Roach, DO

## 2025-01-27 PROCEDURE — 99284 EMERGENCY DEPT VISIT MOD MDM: CPT

## 2025-01-27 ASSESSMENT — COLUMBIA-SUICIDE SEVERITY RATING SCALE - C-SSRS
2. HAVE YOU ACTUALLY HAD ANY THOUGHTS OF KILLING YOURSELF IN THE PAST MONTH?: NO
6. HAVE YOU EVER DONE ANYTHING, STARTED TO DO ANYTHING, OR PREPARED TO DO ANYTHING TO END YOUR LIFE?: NO
1. IN THE PAST MONTH, HAVE YOU WISHED YOU WERE DEAD OR WISHED YOU COULD GO TO SLEEP AND NOT WAKE UP?: NO

## 2025-01-28 ENCOUNTER — HOSPITAL ENCOUNTER (EMERGENCY)
Facility: HOSPITAL | Age: 45
Discharge: HOME | End: 2025-01-28
Payer: COMMERCIAL

## 2025-01-28 VITALS
BODY MASS INDEX: 42.36 KG/M2 | SYSTOLIC BLOOD PRESSURE: 157 MMHG | RESPIRATION RATE: 17 BRPM | WEIGHT: 230.2 LBS | TEMPERATURE: 98.5 F | DIASTOLIC BLOOD PRESSURE: 101 MMHG | HEIGHT: 62 IN | HEART RATE: 72 BPM | OXYGEN SATURATION: 100 %

## 2025-01-28 DIAGNOSIS — R42 LIGHTHEADEDNESS: ICD-10-CM

## 2025-01-28 DIAGNOSIS — R04.0 EPISTAXIS: ICD-10-CM

## 2025-01-28 DIAGNOSIS — R20.2 PARESTHESIA OF LEFT ARM: ICD-10-CM

## 2025-01-28 LAB
BASOPHILS # BLD AUTO: 0.1 10E3/UL (ref 0–0.2)
BASOPHILS NFR BLD AUTO: 1 %
EOSINOPHIL # BLD AUTO: 0.1 10E3/UL (ref 0–0.7)
EOSINOPHIL NFR BLD AUTO: 1 %
ERYTHROCYTE [DISTWIDTH] IN BLOOD BY AUTOMATED COUNT: 12.5 % (ref 10–15)
HCT VFR BLD AUTO: 40.3 % (ref 35–47)
HGB BLD-MCNC: 12.9 G/DL (ref 11.7–15.7)
IMM GRANULOCYTES # BLD: 0 10E3/UL
IMM GRANULOCYTES NFR BLD: 0 %
LYMPHOCYTES # BLD AUTO: 3.4 10E3/UL (ref 0.8–5.3)
LYMPHOCYTES NFR BLD AUTO: 44 %
MCH RBC QN AUTO: 28.9 PG (ref 26.5–33)
MCHC RBC AUTO-ENTMCNC: 32 G/DL (ref 31.5–36.5)
MCV RBC AUTO: 90 FL (ref 78–100)
MONOCYTES # BLD AUTO: 0.5 10E3/UL (ref 0–1.3)
MONOCYTES NFR BLD AUTO: 7 %
NEUTROPHILS # BLD AUTO: 3.6 10E3/UL (ref 1.6–8.3)
NEUTROPHILS NFR BLD AUTO: 47 %
NRBC # BLD AUTO: 0 10E3/UL
NRBC BLD AUTO-RTO: 0 /100
PLATELET # BLD AUTO: 304 10E3/UL (ref 150–450)
RBC # BLD AUTO: 4.47 10E6/UL (ref 3.8–5.2)
WBC # BLD AUTO: 7.6 10E3/UL (ref 4–11)

## 2025-01-28 PROCEDURE — 93005 ELECTROCARDIOGRAM TRACING: CPT

## 2025-01-28 PROCEDURE — 85004 AUTOMATED DIFF WBC COUNT: CPT | Performed by: EMERGENCY MEDICINE

## 2025-01-28 PROCEDURE — 85018 HEMOGLOBIN: CPT | Performed by: EMERGENCY MEDICINE

## 2025-01-28 PROCEDURE — 85048 AUTOMATED LEUKOCYTE COUNT: CPT | Performed by: EMERGENCY MEDICINE

## 2025-01-28 PROCEDURE — 36415 COLL VENOUS BLD VENIPUNCTURE: CPT | Performed by: EMERGENCY MEDICINE

## 2025-01-28 ASSESSMENT — ACTIVITIES OF DAILY LIVING (ADL): ADLS_ACUITY_SCORE: 41

## 2025-01-28 NOTE — ED NOTES
Provider discussed results with patient and set for discharge, patient told nurse that ambulance paramedics told her she has a heart condition and she wants to talk to provider again.

## 2025-01-28 NOTE — ED NOTES
"Went to discuss discharge and patient wants hypertension fixed before leaving. Asked if history of hypertension or any meds and stated \"no\". Looked in chart and Dx hypertension from 7/2023 and prescribed amlodipine and told to follow up with primary. States she has not taken the medication for over a year. Advised to discuss with primary care provider. Will inform ED provider.   "

## 2025-01-28 NOTE — Clinical Note
Juve accompanied Nancy Delgado to the emergency department on 1/27/2025. They may return to work on 01/29/2025.      If you have any questions or concerns, please don't hesitate to call.      Francesco Arguello MD

## 2025-01-28 NOTE — ED TRIAGE NOTES
Patient to triage with [facility] medics for evaluation of epistaxis x 4 days, new onset headache and dizziness tonight. Left work at 1300 and went home to sleep. Was going back to work tonight when she had another nose bleed driving to work. Got to work and endorsed dizziness and feeling faint. Patient has had nosebleeds off and on x4 days.

## 2025-01-28 NOTE — DISCHARGE INSTRUCTIONS
You have been evaluated in the Emergency Department today for your nosebleed. Your bleeding was controlled in the ER with pressure. Your bleeding was most likely caused by dry and fragile skin inside your nose.    Please follow up with your primary care physician as instructed.  Please follow-up with ear nose and throat doctor for your nosebleeds as instructed.    Return to the Emergency Department if you experience worsening or uncontrolled bleeding, shortness of breath, feeling lightheaded or dizzy, loss of consciousness, fainting, nausea or vomiting, chest pain, or for any other concerning symptoms.    Thank you for choosing us for your care.

## 2025-01-28 NOTE — ED PROVIDER NOTES
EMERGENCY DEPARTMENT ENCOUNTER      NAME: Nancy Delgado  AGE: 44 year old female  YOB: 1980  MRN: [id number]  EVALUATION DATE & TIME: No admission date for patient encounter.    PCP: Summer Joel    ED PROVIDER: Francesco Arguello MD      Chief Complaint   Patient presents with    Headache    Epistaxis    Dizziness         FINAL IMPRESSION:  1. Epistaxis    2. Lightheadedness    3. Paresthesia of left arm          ED COURSE & MEDICAL DECISION MAKING:    Pertinent Labs & Imaging studies reviewed. (See chart for details)  44 year old female presents to the Emergency Department for evaluation of nose bleed and left arm paresthesia.  Differential diagnosis considered epistaxis, nasal bone fracture, ischemic stroke injury, injury, DVT, critical limb ischemia, subarachnoid hemorrhage, hypertensive urgency, at bedtime, encephalitis.     Triage Note: Patient to triage with [facility] medics for evaluation of epistaxis x 4 days, new onset headache and dizziness tonight. Left work at 1300 and went home to sleep. Was going back to work tonight when she had another nose bleed driving to work. Got to work and endorsed dizziness and feeling faint. Patient has had nosebleeds off and on x4 days.            ED Course as of 01/30/25 1617   Tue Jan 28, 2025   0119  I met with the patient, obtained history, performed an initial exam, and discussed options and plan for diagnostics and treatment here in the ED.  Patient presenting with intermittent nosebleeds over the last 4 days and dizziness tonight.  Patient began feeling faint and called EMS.  Currently patient has dried blood in the right naris however no active bleeding.  Normal neurologic exam.  No signs of trauma to her head or neck.  Mildly hypertensive but otherwise hemodynamically stable.  Will obtain a CBC.  Will not intervene on her nosebleed as she does not currently have bleeding.   0151 EKG showed normal sinus rhythm.  Criteria for LVH.  No delta  waves, epsilon waves, prolonged QT, dagger Q waves.  No STEMI criteria.  Lower concern for dysrhythmia or ACS.  She is    Stating she has had seconds of paresthesias and shakes her left arm then this improves symptoms.  Lower concern for ACS, aortic dissection based on exam.  Her neurologic exam does not show objective paresthesia.  Do not believe she requires additional labs or imaging.   0154 Patient remained well-appearing nontoxic.  No epistaxis.  Went over nosebleed precautions.  Her CBC showed a normal hemoglobin at 12.9.  No tachycardia or hypotension.  Follow-up with your primary care provider.   0157 We discussed plans for discharge including supportive cares, symptomatic treatment, outpatient follow up, and reasons to return to the emergency department.         Not Applicable    I discussed the plan for discharge with the patient, and patient is agreeable. We discussed supportive cares at home and reasons for return to the ER including new or worsening symptoms - all questions and concerns addressed to the best of my ability. Strict return precautions discussed. Patient to be discharged by RN.    At the conclusion of the encounter I discussed the results of all of the tests and the disposition. The questions were answered. The patient or family acknowledged understanding and was agreeable with the care plan.     MEDICATIONS GIVEN IN THE EMERGENCY:  Medications - No data to display    NEW PRESCRIPTIONS STARTED AT TODAY'S ER VISIT  Discharge Medication List as of 1/28/2025  2:19 AM        Discharge Medication List as of 1/28/2025  2:19 AM          =================================================================    HPI    Patient information was obtained from: Patient and Significant other    Use of : N/A         Nancy Delgado is a 44 year old female with a pertinent history of HTN, balance problems, paresthesias, headaches, who presents to this ED for evaluation of nosebleeds.    Patient  "reports intermittent nose bleeds in the right nostril since 1/24/25. The bleeding lasts for 1-2 minutes then it would resolve on its own. Denies any trauma to the nose. She states that she would pass clots from the nostril, and notes that she is swallowing blood and spitting it up. 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 She left work early and rested at home. She had to go to work again in the evening and developed another nose bleed while driving. Patient's significant other reports that her supervisor called him, stating that the patient fell in the parking lot and they called 911. Patient does not recall falling. She is unsure if she hit her head. She has a cough that started today and mentions she has asthma.     Patient reports experiencing left-sided chest pain and intermittent numbness in the left arm for the past hour while in the ED. She endorses a headache. She does have a history of headaches and takes tylenol for it. She last took tylenol on 1/26/25. Denies vaginal bleeding or additional symptoms. Patient denies seizure or smoking history.    PHYSICAL EXAM    BP (!) 157/101   Pulse 72   Temp 98.5  F (36.9  C) (Oral)   Resp 17   Ht 1.575 m (5' 2\")   Wt 104.4 kg (230 lb 3.2 oz)   SpO2 100%   BMI 42.10 kg/m    Constitutional: Comfortable appearing.  Head: Normocephalic, atraumatic, mucous membranes moist, Dried blood to right anterior naris.  Neck: Supple, gross ROM intact.   Eyes: Pupils mid-range, sclera white.  Respiratory: Clear to auscultation bilaterally, no respiratory distress, no wheezing, speaks full sentences easily.  Cardiovascular: Normal heart rate, regular rhythm, no murmurs. No lower extremity edema.   GI: Soft, no tenderness to deep palpation in all quadrants.  Musculoskeletal: Moving all 4 extremities intentionally and without pain. No obvious deformity.  Skin: Warm, dry, no rash.  Neurologic: Alert & oriented x 3, speech clear, moving " all extremities spontaneously   Neuro: Alert and oriented, CN II-XII grossly intact, speech clear. 5/5 strength in upper and lower extremities. Sensation to light touch intact in all 4 extremities. No pronator drift. No dysmetria with finger to nose. Gait without ataxia. Seconds of paresthesia to left arm.  Psychiatric: Affect normal, cooperative.       LAB:  All pertinent labs reviewed and interpreted.  Results for orders placed or performed during the hospital encounter of 01/28/25   CBC with platelets and differential   Result Value Ref Range    WBC Count 7.6 4.0 - 11.0 10e3/uL    RBC Count 4.47 3.80 - 5.20 10e6/uL    Hemoglobin 12.9 11.7 - 15.7 g/dL    Hematocrit 40.3 35.0 - 47.0 %    MCV 90 78 - 100 fL    MCH 28.9 26.5 - 33.0 pg    MCHC 32.0 31.5 - 36.5 g/dL    RDW 12.5 10.0 - 15.0 %    Platelet Count 304 150 - 450 10e3/uL    % Neutrophils 47 %    % Lymphocytes 44 %    % Monocytes 7 %    % Eosinophils 1 %    % Basophils 1 %    % Immature Granulocytes 0 %    NRBCs per 100 WBC 0 <1 /100    Absolute Neutrophils 3.6 1.6 - 8.3 10e3/uL    Absolute Lymphocytes 3.4 0.8 - 5.3 10e3/uL    Absolute Monocytes 0.5 0.0 - 1.3 10e3/uL    Absolute Eosinophils 0.1 0.0 - 0.7 10e3/uL    Absolute Basophils 0.1 0.0 - 0.2 10e3/uL    Absolute Immature Granulocytes 0.0 <=0.4 10e3/uL    Absolute NRBCs 0.0 10e3/uL       RADIOLOGY:  Reviewed all pertinent imaging. Please see official radiology report.  No orders to display       EKG:    Performed at: 01/28/2025 0139    Impression: Normal sinus rhythm. Minimal voltage criteria for LVH, may be normal variant. Cannot rule out anterior infarct, age undetermined. No STEMI.    Rate: 83 bpm  Rhythm: Normal sinus rhythm  SC Interval: 142 ms  QRS Interval: 78 ms  QTc Interval: 481 ms  ST Changes: None  Comparison: None    I have independently reviewed and interpreted the EKG(s) documented above.    PROCEDURES:   None      Francesco Arguello MD  [facility] EMERGENCY  DEPARTMENT  [address] [place] [id number]  [phone]   =================================================================    BILLING:  Data  Category 1  Non-ED record review, if applicable. External record reviewed: N/A     Clinical information was obtained from an independent historian. History was obtained from: Patient and Significant other provided additional information in the HPI     The following testing was considered but ultimately not selected after discussion with patient/family: N/A     Category 2  My independent interpretation of EKG, rhythm strip, radiology study: N/A     Category 3  Discussion of management with other physician/healthcare provider/other source: N/A       Risk  Prescription medication was considered, but ultimately not given after discussion with patient/family: N/A     Chronic conditions affecting care: Hypertension and headaches,      Care significantly affected by Social Determinants of Health: N/A     Consideration of Admission/Observation: Escalation of care including admission/observation was considered given the complexity and risk of the patient's presenting complaint, exam findings, and/or their underlying comorbidities. However, ultimately I feel the patient is safe for outpatient management with close follow up. Reasoning: Work-up reassuring, does not reveal any acute life/organ threatening processes, patient's symptoms well controlled upon reevaluation, reexamination is reassuring, vitals are stable, patient agreeable with discharge, reliable for follow-up.  Considered patient however no nefarious cause identified.  Patient minimal pain nontoxic.  Patient is requesting a work note which I have given.  The caregiver also at bedside is requesting a note which I have given.      I, Maya Olmos, am serving as a scribe to document services personally performed by Francesco Arguello MD based on my observation and the provider's statements to me. I, Francesco Arguello MD,  attest that Maya Olmos is acting in a scribe capacity, has observed my performance of the services and has documented them in accordance with my direction.       Francesco Arguello MD  01/30/25 0829

## 2025-01-28 NOTE — Clinical Note
Nancy Delgado was seen and treated in our emergency department on 1/27/2025.  She may return to work on 01/30/2025.       If you have any questions or concerns, please don't hesitate to call.      Francesco Arguello MD

## 2025-02-01 LAB
ATRIAL RATE - MUSE: 83 BPM
DIASTOLIC BLOOD PRESSURE - MUSE: NORMAL MMHG
INTERPRETATION ECG - MUSE: NORMAL
P AXIS - MUSE: 43 DEGREES
PR INTERVAL - MUSE: 142 MS
QRS DURATION - MUSE: 78 MS
QT - MUSE: 410 MS
QTC - MUSE: 481 MS
R AXIS - MUSE: -13 DEGREES
SYSTOLIC BLOOD PRESSURE - MUSE: NORMAL MMHG
T AXIS - MUSE: 12 DEGREES
VENTRICULAR RATE- MUSE: 83 BPM

## 2025-02-05 ENCOUNTER — HOSPITAL ENCOUNTER (EMERGENCY)
Facility: CLINIC | Age: 45
Discharge: HOME | End: 2025-02-05
Attending: FAMILY MEDICINE | Admitting: FAMILY MEDICINE
Payer: COMMERCIAL

## 2025-02-05 VITALS
TEMPERATURE: 99.4 F | BODY MASS INDEX: 42.33 KG/M2 | SYSTOLIC BLOOD PRESSURE: 162 MMHG | WEIGHT: 230 LBS | RESPIRATION RATE: 18 BRPM | DIASTOLIC BLOOD PRESSURE: 93 MMHG | HEIGHT: 62 IN | OXYGEN SATURATION: 100 % | HEART RATE: 108 BPM

## 2025-02-05 DIAGNOSIS — J10.1 INFLUENZA A: ICD-10-CM

## 2025-02-05 LAB
FLUAV RNA SPEC QL NAA+PROBE: POSITIVE
FLUBV RNA RESP QL NAA+PROBE: NEGATIVE
RSV RNA SPEC NAA+PROBE: NEGATIVE
SARS-COV-2 RNA RESP QL NAA+PROBE: NEGATIVE

## 2025-02-05 PROCEDURE — 99283 EMERGENCY DEPT VISIT LOW MDM: CPT

## 2025-02-05 PROCEDURE — 87637 SARSCOV2&INF A&B&RSV AMP PRB: CPT | Performed by: FAMILY MEDICINE

## 2025-02-05 ASSESSMENT — COLUMBIA-SUICIDE SEVERITY RATING SCALE - C-SSRS
1. IN THE PAST MONTH, HAVE YOU WISHED YOU WERE DEAD OR WISHED YOU COULD GO TO SLEEP AND NOT WAKE UP?: NO
2. HAVE YOU ACTUALLY HAD ANY THOUGHTS OF KILLING YOURSELF IN THE PAST MONTH?: NO
6. HAVE YOU EVER DONE ANYTHING, STARTED TO DO ANYTHING, OR PREPARED TO DO ANYTHING TO END YOUR LIFE?: NO

## 2025-02-05 NOTE — ED TRIAGE NOTES
Patient states since yesterday she's had headache, body aches, cough, is drinking and eating, feels lightheaded, took Tylenol last night with some relief and inhaler for asthma.      Triage Assessment (Adult)       Row Name 02/05/25 1303          Triage Assessment    Airway WDL WDL        Respiratory WDL    Respiratory WDL WDL        Skin Circulation/Temperature WDL    Skin Circulation/Temperature WDL WDL        Cardiac WDL    Cardiac WDL WDL        Peripheral/Neurovascular WDL    Peripheral Neurovascular WDL WDL        Cognitive/Neuro/Behavioral WDL    Cognitive/Neuro/Behavioral WDL WDL

## 2025-02-05 NOTE — Clinical Note
Nancy Delgado was seen and treated in our emergency department on 2/5/2025.  She may return to work on 02/11/2025.       If you have any questions or concerns, please don't hesitate to call.      Leonard Brady MD

## 2025-02-05 NOTE — ED PROVIDER NOTES
EMERGENCY DEPARTMENT ENCOUNTER      NAME: Nancy Delgado  AGE: 44 year old female  YOB: 1980  MRN: [id number]  EVALUATION DATE & TIME: No admission date for patient encounter.    PCP: Summer Joel    ED PROVIDER: Leonard Brady M.D.    Chief Complaint   Patient presents with    Generalized Body Aches    Cough       FINAL IMPRESSION:  1. Influenza A        ED COURSE & MEDICAL DECISION MAKING:    Pertinent Labs & Imaging studies independently interpreted by me. (See chart for details)  Reviewed most recent emergency department visit from January 28 when patient was seen with paresthesia, lightheadedness and bloody nose, labs at that time were reassuring and patient was discharged, at that time hemoglobin 12.9.    ED Course as of 02/05/25 1652   Wed Feb 05, 2025   1423 Discussed patient with student, patient seen and examined.  Patient with upper respiratory symptoms of cough and scratchy throat along with fever going on for the last couple of days.  Has not taken anything for fever since the first day.  On exam here, tachycardic and temperature slightly elevated although not febrile, oxygen sats are 100%, no respiratory distress, lungs are clear.  Respiratory panel ordered and further testing including possible chest x-ray based on those results.   1537 Patient seen and examined in the lobby due to critical capacity in the department.  Labs independently interpreted by me with positive influenza test.  Discussed this result, patient stable for discharge with outpatient follow-up         At the conclusion of the encounter I discussed the results of all of the tests and the disposition. The questions were answered. The patient or family acknowledged understanding and was agreeable with the care plan.     Medical Decision Making  Obtained supplemental history:Supplemental history obtained?: No  Reviewed external records: External records reviewed?: Documented in chart  Care impacted  by chronic illness:Hypertension  Did you consider but not order tests?: Work up considered but not performed and documented in chart, if applicable  Did you interpret images independently?: Independent interpretation of ECG and images noted in documentation, when applicable.  Consultation discussion with other provider:Did you involve another provider (consultant, , pharmacy, etc.)?: No  Discharge. No recommendations on prescription strength medication(s). N/A.    MIPS: Not Applicable      MEDICATIONS GIVEN IN THE EMERGENCY:  Medications - No data to display    NEW PRESCRIPTIONS STARTED AT TODAY'S ER VISIT  Discharge Medication List as of 2/5/2025  3:40 PM          =================================================================    HPI    Patient information was obtained from: Patient       Nancy Delgado is a 44 year old female with a pertinent history of hypertension, asthma, and morbid obesity who presents to this ED via private vehicle for evaluation of generalized body aches and cough. Two days ago patient starting having cough, nausea, fever, scratchy throat.  History of asthma and used albuterol yeterday.      REVIEW OF SYSTEMS   Review of Systems   All other systems reviewed and negative    PAST MEDICAL HISTORY:  Past Medical History:   Diagnosis Date    Asthma     Benign intracranial hypertension 1/14/2013    Essential hypertension 2/26/2020    Migraine without aura and without status migrainosus, not intractable 2/26/2020    Moderate persistent asthma 9/1/2007    Formatting of this note might be different from the original. Overview:  Uses albuterol TID-QID and Advair BID Formatting of this note might be different from the original. Uses albuterol TID-QID and Advair BID  Overview:  Overview:  Uses albuterol TID-QID and Advair BID    Morbid obesity (H) 4/21/2021    Myofascial pain dysfunction syndrome 8/1/2007    PPD positive 8/29/2007    Formatting of this note might be different from the original.  Overview:  S/p therapy Formatting of this note might be different from the original. S/p therapy  Overview:  Overview:  S/p therapy    PVC (premature ventricular contraction) 4/21/2021    Formatting of this note might be different from the original. Low burden but symptomatic. Noted on outpatient holter monitor and in recovery phase of stress testing.       PAST SURGICAL HISTORY:  Past Surgical History:   Procedure Laterality Date    OTHER SURGICAL HISTORY      Right-sided tympanomastoidectomy       CURRENT MEDICATIONS:    No current facility-administered medications for this encounter.     Current Outpatient Medications   Medication Sig Dispense Refill    amLODIPine (NORVASC) 5 MG tablet Take 1 tablet (5 mg) by mouth daily 30 tablet 0    fluticasone (FLOVENT HFA) 44 MCG/ACT inhaler Inhale 2 puffs into the lungs      ketorolac (TORADOL) 10 MG tablet Take 1 tablet (10 mg) by mouth every 6 hours as needed for moderate pain 20 tablet 0    lidocaine (LMX4) 4 % external cream Apply topically every 6 hours as needed for mild pain or other (labial skin pain.) 15 g 0    metoclopramide (REGLAN) 10 MG tablet Take 1 tablet (10 mg) by mouth 3 times daily as needed (headache and nausea) 30 tablet 0    UBRELVY 50 MG tablet TAKE 1 TABLET BY MOUTH AT ONSET OF HEADACHE. MAY REPEAT 1 DOSE IN 2 HOURS AS NEEDED. MAX DOSE 100 MG / 24 HOURS         ALLERGIES:  Allergies   Allergen Reactions    Acetazolamide Itching    Tramadol Itching    Oxycodone Unknown    Vicodin [Hydrocodone-Acetaminophen] Unknown       FAMILY HISTORY:  No family history on file.    SOCIAL HISTORY:   Social History     Socioeconomic History    Marital status:    Tobacco Use    Smoking status: Never   Substance and Sexual Activity    Alcohol use: No    Drug use: No     Social Drivers of Health     Food Insecurity: No Food Insecurity (12/3/2024)    Received from [facility] Vital Sign     Worried About Running Out of Food in the Last Year:  "Never true     Ran Out of Food in the Last Year: Never true       VITALS:  BP (!) 162/93   Pulse 108   Temp 99.4  F (37.4  C) (Oral)   Resp 18   Ht 1.575 m (5' 2\")   Wt 104.3 kg (230 lb)   SpO2 100%   BMI 42.07 kg/m      PHYSICAL EXAM:  Physical Exam  Vitals and nursing note reviewed.   Constitutional:       Appearance: Normal appearance.   HENT:      Head: Normocephalic and atraumatic.      Right Ear: External ear normal.      Left Ear: External ear normal.      Nose: Nose normal.      Mouth/Throat:      Mouth: Mucous membranes are moist.   Eyes:      Extraocular Movements: Extraocular movements intact.      Conjunctiva/sclera: Conjunctivae normal.      Pupils: Pupils are equal, round, and reactive to light.   Cardiovascular:      Rate and Rhythm: Regular rhythm. Tachycardia present.   Pulmonary:      Effort: Pulmonary effort is normal.      Breath sounds: Normal breath sounds. No wheezing or rales.   Abdominal:      General: Abdomen is flat. There is no distension.      Palpations: Abdomen is soft.      Tenderness: There is no abdominal tenderness. There is no guarding.   Musculoskeletal:         General: Normal range of motion.      Cervical back: Normal range of motion and neck supple.      Right lower leg: No edema.      Left lower leg: No edema.   Lymphadenopathy:      Cervical: No cervical adenopathy.   Skin:     General: Skin is warm and dry.   Neurological:      General: No focal deficit present.      Mental Status: She is alert and oriented to person, place, and time. Mental status is at baseline.      Comments: No gross focal neurologic deficits   Psychiatric:         Mood and Affect: Mood normal.         Behavior: Behavior normal.         Thought Content: Thought content normal.          LAB:  All pertinent labs reviewed and interpreted.  Results for orders placed or performed during the hospital encounter of 02/05/25   Influenza A/B, RSV and SARS-CoV2 PCR (COVID-19) Nasopharyngeal    Specimen: " Nasopharyngeal; Swab   Result Value Ref Range    Influenza A PCR Positive (A) Negative    Influenza B PCR Negative Negative    RSV PCR Negative Negative    SARS CoV2 PCR Negative Negative       RADIOLOGY:  Reviewed all pertinent imaging. Please see official radiology report.  No orders to display       I, León Aquiles, am serving as a scribe to document services personally performed by Dr. Brady based on my observation and the provider's statements to me. I, Leonard Brady MD attest that León Kwan is acting in a scribe capacity, has observed my performance of the services and has documented them in accordance with my direction.    Leonard Brady M.D.  Emergency Medicine  [facility] EMERGENCY ROOM  [address] [phone]  Dept: [phone]       Leonard Brady MD  02/05/25 7928

## 2025-02-05 NOTE — ED NOTES
[facility] See provider [facility] for assessment. AVS reviewed with pt by provider. Discharge VS not obtained.

## 2025-07-15 ENCOUNTER — HOSPITAL ENCOUNTER (EMERGENCY)
Facility: CLINIC | Age: 45
Discharge: HOME | End: 2025-07-15
Attending: EMERGENCY MEDICINE
Payer: COMMERCIAL

## 2025-07-15 VITALS
OXYGEN SATURATION: 99 % | WEIGHT: 220 LBS | RESPIRATION RATE: 20 BRPM | TEMPERATURE: 98.2 F | HEART RATE: 103 BPM | BODY MASS INDEX: 40.48 KG/M2 | DIASTOLIC BLOOD PRESSURE: 90 MMHG | HEIGHT: 62 IN | SYSTOLIC BLOOD PRESSURE: 143 MMHG

## 2025-07-15 DIAGNOSIS — J02.9 PHARYNGITIS, UNSPECIFIED ETIOLOGY: Primary | ICD-10-CM

## 2025-07-15 DIAGNOSIS — J02.9 VIRAL PHARYNGITIS: ICD-10-CM

## 2025-07-15 LAB
FLUAV RNA SPEC QL NAA+PROBE: NEGATIVE
FLUBV RNA RESP QL NAA+PROBE: NEGATIVE
RSV RNA SPEC NAA+PROBE: NEGATIVE
S PYO DNA THROAT QL NAA+PROBE: NOT DETECTED
SARS-COV-2 RNA RESP QL NAA+PROBE: NEGATIVE

## 2025-07-15 PROCEDURE — 250N000011 HC RX IP 250 OP 636

## 2025-07-15 PROCEDURE — 87651 STREP A DNA AMP PROBE: CPT

## 2025-07-15 PROCEDURE — 99283 EMERGENCY DEPT VISIT LOW MDM: CPT | Performed by: EMERGENCY MEDICINE

## 2025-07-15 PROCEDURE — 250N000013 HC RX MED GY IP 250 OP 250 PS 637

## 2025-07-15 PROCEDURE — 87637 SARSCOV2&INF A&B&RSV AMP PRB: CPT

## 2025-07-15 RX ORDER — IBUPROFEN 600 MG/1
600 TABLET, FILM COATED ORAL ONCE
Status: COMPLETED | OUTPATIENT
Start: 2025-07-15 | End: 2025-07-15

## 2025-07-15 RX ORDER — ONDANSETRON 4 MG/1
4 TABLET, ORALLY DISINTEGRATING ORAL EVERY 8 HOURS PRN
Qty: 10 TABLET | Refills: 0 | Status: SHIPPED | OUTPATIENT
Start: 2025-07-15 | End: 2025-07-18

## 2025-07-15 RX ORDER — ONDANSETRON 4 MG/1
4 TABLET, ORALLY DISINTEGRATING ORAL ONCE
Status: COMPLETED | OUTPATIENT
Start: 2025-07-15 | End: 2025-07-15

## 2025-07-15 RX ADMIN — IBUPROFEN 600 MG: 600 TABLET ORAL at 11:55

## 2025-07-15 RX ADMIN — BENZOCAINE AND MENTHOL 1 LOZENGE: 15; 3.6 LOZENGE ORAL at 11:58

## 2025-07-15 RX ADMIN — ONDANSETRON 4 MG: 4 TABLET, ORALLY DISINTEGRATING ORAL at 11:55

## 2025-07-15 NOTE — ED NOTES
Pt seen and discharged from [facility]. Please see provider's note for assessment. Reviewed discharge paperwork and answered all questions.

## 2025-07-15 NOTE — ED TRIAGE NOTES
Pt comes to the ED from home with c/o sore throat, chills, body aches, and weakness. Pt admits to nausea as too. Denies fever.Symptoms started on Sunday, has taken tamaflu, Tylenol, and other OTC medications with not much relief.

## 2025-07-15 NOTE — Clinical Note
Nancy Delgado was seen and treated in our emergency department on 7/15/2025.  She may return to work on 07/18/2025.  Return to work 7/18 or earlier if symptoms improve sooner     If you have any questions or concerns, please don't hesitate to call.      Kacey Patel MD

## 2025-07-15 NOTE — ED PROVIDER NOTES
I, Gina Murray have reviewed the documentation, personally taken the patient's history, performed an exam and agree with the physical finds, diagnosis and management plan.    HPI: 44-year-old female with history of HTN, asthma, migraine headaches.  Here w c/o 3d of R sided sore throat, scratchy. Now whole throat. Fatigue, body aches. Taking theraflu, tylenol, motrin w/o improvement. No f/c/v/cough/sob. Mild nausea.    Physical Exam:    Well appearing, obese  1-2+ tonsillar edema, minimal erythema, no uvular deviation or exudate  Nauseated and holding an emesis basin    MDM: Viral versus bacterial pharyngitis.  Nothing to suggest peritonsillar, retropharyngeal abscess, epiglottitis, mononucleosis    ED Course/workup: COVID flu RSV and strep negative.  Reassured counseled discharged home.             Final Diagnosis:     ICD-10-CM    1. Pharyngitis, unspecified etiology  J02.9             Medical Decision Making  I obtained history from Family Member/Significant Other  I reviewed the EMR: Outpatient Record: 2/20/25 clinic visit  Discharge. No recommendations on prescription strength medication(s). See documentation for any additional details.    MIPS (CTPE, Dental pain, [place], Sinusitis, Asthma/COPD, Head Trauma): Not Applicable    SEPSIS: None        I personally saw the patient and performed a substantive portion of the visit including all aspects of the medical decision making.  I personally made/approved the management plan and take responsibility for the patient management.     MD Trevor Ivy Zabrina N, MD  07/15/25 0117

## 2025-07-15 NOTE — DISCHARGE INSTRUCTIONS
CHEST 1 VIEW 5:30 PM



INDICATION / CLINICAL INFORMATION:

Fever.



COMPARISON: 

None available.



FINDINGS:



SUPPORT DEVICES: None.

HEART / MEDIASTINUM: The heart size and pulmonary vasculature are normal area 

LUNGS / PLEURA: There is a small focal area of parenchymal opacity in the left medial lung base in th
e retrocardiac region. The lungs are otherwise clear. There is no evidence of pleural effusion. No pn
eumothorax. 

ADDITIONAL FINDINGS: No significant additional findings.



IMPRESSION: Small area of pneumonia in the left lower lobe medially.



Signer Name: Joe Alvarado MD 

Signed: 6/6/2020 5:54 PM

Workstation Name: [id number] Continue to use ibuprofen and tylenol as needed for sore throat and body aches. Use zofran as needed for nausea.

## 2025-07-15 NOTE — ED PROVIDER NOTES
EMERGENCY DEPARTMENT ENCOUNTER      NAME: Nancy Delgado  AGE: 44 year old female  YOB: 1980  MRN: [id number]  EVALUATION DATE & TIME: No admission date for patient encounter.    PCP: Summer Joel    ED PROVIDER: Kacey Patel MD      Chief Complaint   Patient presents with    Flu Symptoms         FINAL IMPRESSION:  1. Pharyngitis, unspecified etiology    2. Viral pharyngitis          ED COURSE & MEDICAL DECISION MAKING:    Pertinent Labs & Imaging studies reviewed. (See chart for details)  44 year old female presents to the Emergency Department for evaluation of sore throat and fatigue.     Patient reports 2 days of right sided throat pain described as scratchy that has now progressed to whole throat pain. Associated fatigue, body aches, no fever, no cough, no SOB. Taking theraflu, ibuprofen, tylenol with minimal improvement.     On evaluation, patient is non-toxic appearing, afebrile, no acute distress. On exam, oropharynx is non-erythematous, non-edematous, no exudates. Patient reports pain with swallowing but no objective obstruction, no difficulty breathing, no hypersalivation, no abdominal pain    Differential diagnosis includes but is not limited to viral vs bacterial pharyngitis,     Clinical presentation is not concerning for retropharyngeal abscess, peritonsillar abscess, epiglottitis, mononucleosis.      Influenza A & B, COVID, Rapid strep negative    The patient was given ibuprofen for pain with mild improvement and zofran for nausea.     The patient was reevaluated and we discussed that she most likely has viral pharyngitis that will most likely self-resolve. We suggest discharge to home with supportive care for pain and nausea, and the patient is amenable to the plan. We instructed to patient to return to the ED if she experiences new or worsening symptoms.        At the conclusion of the encounter I discussed the results of all of the tests and the disposition. The  questions were answered. The patient or family acknowledged understanding and was agreeable with the care plan.     0 minutes of critical care time     MEDICATIONS GIVEN IN THE EMERGENCY:  Medications   benzocaine-menthol (CEPACOL) 15-3.6 MG lozenge 1 lozenge (1 lozenge Buccal $Given 7/15/25 1158)   ibuprofen (ADVIL/MOTRIN) tablet 600 mg (600 mg Oral $Given 7/15/25 1155)   ondansetron (ZOFRAN ODT) ODT tab 4 mg (4 mg Oral $Given 7/15/25 1155)       NEW PRESCRIPTIONS STARTED AT TODAY'S ER VISIT  Discharge Medication List as of 7/15/2025 12:30 PM        START taking these medications    Details   ondansetron (ZOFRAN ODT) 4 MG ODT tab Take 1 tablet (4 mg) by mouth every 8 hours as needed for nausea., Disp-10 tablet, R-0, Local Print                =================================================================    HPI    Patient information was obtained from: patient      Nancy Delgado is a 44 year old female with a pertinent history of asthma who presents to this ED driven by self  for evaluation of sore throat and fatigue.    Patient states that the sore throat started on Sunday and has progressively worsened. Initially scratchy pain on right throat that now involves entire throat. It is associated with chills, body aches, generalized fatigue, and nausea. She denies cough, fever, shortness of breath, difficulty breathing, chest pain. She also has a persistent headache, but says she has headaches regularly and this is similar to previous experiences. Sore throat and body aches are improved moderately with tylenol, ibuprofen. Symptoms have not improved with Theraflu.       REVIEW OF SYSTEMS   Review of Systems     PAST MEDICAL HISTORY:  Past Medical History:   Diagnosis Date    Asthma     Benign intracranial hypertension 1/14/2013    Essential hypertension 2/26/2020    Migraine without aura and without status migrainosus, not intractable 2/26/2020    Moderate persistent asthma 9/1/2007    Formatting of this note  might be different from the original. Overview:  Uses albuterol TID-QID and Advair BID Formatting of this note might be different from the original. Uses albuterol TID-QID and Advair BID  Overview:  Overview:  Uses albuterol TID-QID and Advair BID    Morbid obesity (H) 4/21/2021    Myofascial pain dysfunction syndrome 8/1/2007    PPD positive 8/29/2007    Formatting of this note might be different from the original. Overview:  S/p therapy Formatting of this note might be different from the original. S/p therapy  Overview:  Overview:  S/p therapy    PVC (premature ventricular contraction) 4/21/2021    Formatting of this note might be different from the original. Low burden but symptomatic. Noted on outpatient holter monitor and in recovery phase of stress testing.       PAST SURGICAL HISTORY:  Past Surgical History:   Procedure Laterality Date    OTHER SURGICAL HISTORY      Right-sided tympanomastoidectomy           CURRENT MEDICATIONS:    amLODIPine (NORVASC) 5 MG tablet  fluticasone (FLOVENT HFA) 44 MCG/ACT inhaler  ketorolac (TORADOL) 10 MG tablet  lidocaine (LMX4) 4 % external cream  metoclopramide (REGLAN) 10 MG tablet  ondansetron (ZOFRAN ODT) 4 MG ODT tab  UBRELVY 50 MG tablet        ALLERGIES:  Allergies   Allergen Reactions    Acetazolamide Itching    Tramadol Itching    Oxycodone Unknown    Vicodin [Hydrocodone-Acetaminophen] Unknown       FAMILY HISTORY:  No family history on file.    SOCIAL HISTORY:   Social History     Socioeconomic History    Marital status:    Tobacco Use    Smoking status: Never   Substance and Sexual Activity    Alcohol use: No    Drug use: No     Social Drivers of Health     Food Insecurity: No Food Insecurity (12/3/2024)    Received from [facility] Vital Sign     Worried About Running Out of Food in the Last Year: Never true     Ran Out of Food in the Last Year: Never true       VITALS:  BP (!) 143/90   Pulse 103   Temp 98.2  F (36.8  C)   Resp 20   Ht  "1.575 m (5' 2\")   Wt 99.8 kg (220 lb)   LMP  (LMP Unknown)   SpO2 99%   BMI 40.24 kg/m      PHYSICAL EXAM    Constitutional: Well developed, Well nourished, NAD  HENT: Oropharynx normal, no erythema or exudates. mucous membranes moist, Nose normal. Neck-  Normal range of motion, No tenderness, Supple, No stridor. No lymphadenopathy.   Eyes: PERRL, EOMI, Conjunctiva normal, No discharge.   Respiratory: Normal breath sounds, No respiratory distress, No wheezing, Speaks full sentences easily. No cough.    Cardiovascular: Normal heart rate, Regular rhythm, No murmurs, No rubs, No gallops.   GI: Soft, No tenderness  Integument: Warm, Dry, No erythema, No rash. No petechiae.   Neurologic: Alert & oriented x 3, Normal motor function, Normal sensory function, No focal deficits noted.   Psychiatric: Affect normal, Judgment normal, Mood normal. Cooperative.      LAB:  All pertinent labs reviewed and interpreted.  Results for orders placed or performed during the hospital encounter of 07/15/25   Influenza A/B, RSV and SARS-CoV2 PCR (COVID-19) Nasopharyngeal    Specimen: Nasopharyngeal; Swab   Result Value Ref Range    Influenza A PCR Negative Negative    Influenza B PCR Negative Negative    RSV PCR Negative Negative    SARS CoV2 PCR Negative Negative   Group A Streptococcus PCR Throat Swab    Specimen: Throat; Swab   Result Value Ref Range    Group A strep by PCR Not Detected Not Detected       RADIOLOGY:  Reviewed all pertinent imaging. Please see official radiology report.  No orders to display     Kacey Patel MD  [facility] EMERGENCY ROOM  [address] [place] [id number] [phone]       Kacey Patel MD  Resident  07/15/25 1429    "